# Patient Record
Sex: FEMALE | Race: WHITE | NOT HISPANIC OR LATINO | Employment: FULL TIME | URBAN - METROPOLITAN AREA
[De-identification: names, ages, dates, MRNs, and addresses within clinical notes are randomized per-mention and may not be internally consistent; named-entity substitution may affect disease eponyms.]

---

## 2024-07-24 ENCOUNTER — NURSE TRIAGE (OUTPATIENT)
Dept: CALL CENTER | Facility: HOSPITAL | Age: 49
End: 2024-07-24
Payer: MEDICAID

## 2024-07-24 NOTE — TELEPHONE ENCOUNTER
HUB call Blood pressure concerns  Wanting to establish care with new MD  Went to [facility] ED 07/22/2024    B/P was 233/over something  She was having a panic attack    Was prescribed Amlodipine 10 mg daily. Has been taking medication x 2 days     B/P today 190/104  Triage done and Care advice given  S/S reviewed when to seek emergent care  Wanting to establish care with new provider  Warm transfer to [place] office Spoke with Narciso at the [place] office  Reason for Disposition   Systolic BP  >= 180 OR Diastolic >= 110    Additional Information   Negative: Difficult to awaken or acting confused (e.g., disoriented, slurred speech)   Negative: SEVERE difficulty breathing (e.g., struggling for each breath, speaks in single words)   Negative: [1] Weakness of the face, arm or leg on one side of the body AND [2] new-onset   Negative: [1] Numbness (i.e., loss of sensation) of the face, arm or leg on one side of the body AND [2] new-onset   Negative: [1] Chest pain lasts > 5 minutes AND [2] history of heart disease (i.e., heart attack, bypass surgery, angina, angioplasty, CHF)   Negative: [1] Chest pain AND [2] took nitrogylcerin AND [3] pain was not relieved   Negative: Sounds like a life-threatening emergency to the triager   Negative: Symptom is main concern (e.g., headache, chest pain)   Negative: Low blood pressure is main concern   Negative: [1] Systolic BP  >= 160 OR Diastolic >= 100 AND [2] cardiac (e.g., breathing difficulty, chest pain) or neurologic symptoms (e.g., new-onset blurred or double vision, unsteady gait)   Negative: [1] Pregnant 20 or more weeks (or postpartum < 6 weeks) AND [2] new hand or face swelling   Negative: [1] Pregnant 20 or more weeks (or postpartum < 6 weeks) AND [2] Systolic BP >= 160 OR Diastolic >= 110   Negative: [1] Systolic BP  >= 200 OR Diastolic >= 120 AND [2] having NO cardiac or neurologic symptoms   Negative: [1] Pregnant 20 or more weeks (or postpartum < 6 weeks) AND  "[2] Systolic BP  >= 140 OR Diastolic >= 90   Negative: [1] Systolic BP  >= 180 OR Diastolic >= 110 AND [2] missed most recent dose of blood pressure medication    Answer Assessment - Initial Assessment Questions  1. BLOOD PRESSURE: \"What is the blood pressure?\" \"Did you take at least two measurements 5 minutes apart?\"      190/104  2. ONSET: \"When did you take your blood pressure?\"     This am  3. HOW: \"How did you take your blood pressure?\" (e.g., automatic home BP monitor, visiting nurse)  automatic  4. HISTORY: \"Do you have a history of high blood pressure?\"      Just diagnosed  5. MEDICINES: \"Are you taking any medicines for blood pressure?\" \"Have you missed   Started Amlodipine 10 mg daily 2 days ago        6. OTHER SYMPTOMS: \"Do you have any symptoms?\" (e.g., blurred vision, chest pain, difficulty breathing, headache, weakness)      Has had a headache  7. PREGNANCY: \"Is there any chance you are pregnant?\" \"When was your last menstrual period?\"     na    Protocols used: Blood Pressure - High-ADULT-AH    "

## 2024-08-04 PROBLEM — F41.9 ANXIETY: Status: ACTIVE | Noted: 2024-08-04

## 2024-08-04 PROBLEM — M54.2 NECK PAIN: Status: ACTIVE | Noted: 2024-08-04

## 2024-08-04 PROBLEM — K04.7 DENTAL INFECTION: Status: ACTIVE | Noted: 2024-08-04

## 2024-08-04 PROBLEM — M54.2 NECK PAIN: Chronic | Status: ACTIVE | Noted: 2024-08-04

## 2024-08-04 NOTE — PATIENT INSTRUCTIONS
Health Maintenance Due   Topic Date Due    MAMMOGRAM  Never done    COLORECTAL CANCER SCREENING  Never done    TDAP/TD VACCINES (1 - Tdap) Never done    COVID-19 Vaccine (1 - 2023-24 season) Never done    HEPATITIS C SCREENING  Never done    ANNUAL PHYSICAL  Never done    PAP SMEAR  Never done    INFLUENZA VACCINE  08/01/2024    3 days after starts toprolol Check blood pressure cuff for accuracy and send 10 blood pressures over 2 weeks.  Watch sodium, alcohol and weight

## 2024-08-05 ENCOUNTER — LAB (OUTPATIENT)
Dept: FAMILY MEDICINE CLINIC | Facility: CLINIC | Age: 49
End: 2024-08-05
Payer: MEDICAID

## 2024-08-05 ENCOUNTER — OFFICE VISIT (OUTPATIENT)
Dept: FAMILY MEDICINE CLINIC | Facility: CLINIC | Age: 49
End: 2024-08-05
Payer: MEDICAID

## 2024-08-05 VITALS
BODY MASS INDEX: 57.41 KG/M2 | SYSTOLIC BLOOD PRESSURE: 135 MMHG | DIASTOLIC BLOOD PRESSURE: 87 MMHG | WEIGHT: 292.4 LBS | OXYGEN SATURATION: 96 % | HEIGHT: 60 IN | TEMPERATURE: 97.7 F | HEART RATE: 90 BPM

## 2024-08-05 DIAGNOSIS — Z00.01 ENCOUNTER FOR ANNUAL GENERAL MEDICAL EXAMINATION WITH ABNORMAL FINDINGS IN ADULT: Primary | ICD-10-CM

## 2024-08-05 DIAGNOSIS — Z12.11 SCREENING FOR MALIGNANT NEOPLASM OF COLON: ICD-10-CM

## 2024-08-05 DIAGNOSIS — N39.0 URINARY TRACT INFECTION WITHOUT HEMATURIA, SITE UNSPECIFIED: ICD-10-CM

## 2024-08-05 DIAGNOSIS — Z91.89 ENCOUNTER FOR HEPATITIS C VIRUS SCREENING TEST FOR HIGH RISK PATIENT: ICD-10-CM

## 2024-08-05 DIAGNOSIS — M54.2 NECK PAIN: Chronic | ICD-10-CM

## 2024-08-05 DIAGNOSIS — Z12.4 SCREENING FOR CERVICAL CANCER: ICD-10-CM

## 2024-08-05 DIAGNOSIS — K04.7 DENTAL INFECTION: ICD-10-CM

## 2024-08-05 DIAGNOSIS — E55.9 VITAMIN D DEFICIENCY: ICD-10-CM

## 2024-08-05 DIAGNOSIS — F41.9 ANXIETY: ICD-10-CM

## 2024-08-05 DIAGNOSIS — Z12.11 SCREENING FOR COLON CANCER: ICD-10-CM

## 2024-08-05 DIAGNOSIS — Z13.220 SCREENING CHOLESTEROL LEVEL: ICD-10-CM

## 2024-08-05 DIAGNOSIS — Z11.4 SCREENING FOR HIV (HUMAN IMMUNODEFICIENCY VIRUS): ICD-10-CM

## 2024-08-05 DIAGNOSIS — Z12.31 ENCOUNTER FOR SCREENING MAMMOGRAM FOR MALIGNANT NEOPLASM OF BREAST: ICD-10-CM

## 2024-08-05 DIAGNOSIS — Z13.1 SCREENING FOR DIABETES MELLITUS: ICD-10-CM

## 2024-08-05 DIAGNOSIS — Z11.59 ENCOUNTER FOR HEPATITIS C VIRUS SCREENING TEST FOR HIGH RISK PATIENT: ICD-10-CM

## 2024-08-05 DIAGNOSIS — E66.01 CLASS 3 SEVERE OBESITY DUE TO EXCESS CALORIES WITH SERIOUS COMORBIDITY AND BODY MASS INDEX (BMI) OF 50.0 TO 59.9 IN ADULT: ICD-10-CM

## 2024-08-05 PROBLEM — E66.813 CLASS 3 SEVERE OBESITY DUE TO EXCESS CALORIES WITH SERIOUS COMORBIDITY AND BODY MASS INDEX (BMI) OF 50.0 TO 59.9 IN ADULT: Status: ACTIVE | Noted: 2024-08-05

## 2024-08-05 LAB
ALBUMIN SERPL-MCNC: 4.6 G/DL (ref 3.5–5.2)
ALBUMIN/GLOB SERPL: 1.4 G/DL
ALP SERPL-CCNC: 85 U/L (ref 39–117)
ALT SERPL W P-5'-P-CCNC: 53 U/L (ref 1–33)
ANION GAP SERPL CALCULATED.3IONS-SCNC: 14.9 MMOL/L (ref 5–15)
AST SERPL-CCNC: 31 U/L (ref 1–32)
BASOPHILS # BLD AUTO: 0.07 10*3/MM3 (ref 0–0.2)
BASOPHILS NFR BLD AUTO: 0.6 % (ref 0–1.5)
BILIRUB SERPL-MCNC: 0.6 MG/DL (ref 0–1.2)
BILIRUB UR QL STRIP: NEGATIVE
BUN SERPL-MCNC: 9 MG/DL (ref 6–20)
BUN/CREAT SERPL: 12.9 (ref 7–25)
CALCIUM SPEC-SCNC: 10.2 MG/DL (ref 8.6–10.5)
CHLORIDE SERPL-SCNC: 96 MMOL/L (ref 98–107)
CHOLEST SERPL-MCNC: 200 MG/DL (ref 0–200)
CLARITY UR: CLEAR
CO2 SERPL-SCNC: 26.1 MMOL/L (ref 22–29)
COLOR UR: YELLOW
CREAT SERPL-MCNC: 0.7 MG/DL (ref 0.57–1)
DEPRECATED RDW RBC AUTO: 45.1 FL (ref 37–54)
EGFRCR SERPLBLD CKD-EPI 2021: 106.2 ML/MIN/1.73
EOSINOPHIL # BLD AUTO: 0.14 10*3/MM3 (ref 0–0.4)
EOSINOPHIL NFR BLD AUTO: 1.2 % (ref 0.3–6.2)
ERYTHROCYTE [DISTWIDTH] IN BLOOD BY AUTOMATED COUNT: 13.5 % (ref 12.3–15.4)
GLOBULIN UR ELPH-MCNC: 3.3 GM/DL
GLUCOSE SERPL-MCNC: 253 MG/DL (ref 65–99)
GLUCOSE UR STRIP-MCNC: ABNORMAL MG/DL
HCT VFR BLD AUTO: 53.5 % (ref 34–46.6)
HCV AB SER QL: NORMAL
HDLC SERPL-MCNC: 63 MG/DL (ref 40–60)
HGB BLD-MCNC: 17 G/DL (ref 12–15.9)
HGB UR QL STRIP.AUTO: NEGATIVE
HIV 1+2 AB+HIV1 P24 AG SERPL QL IA: NORMAL
IMM GRANULOCYTES # BLD AUTO: 0.06 10*3/MM3 (ref 0–0.05)
IMM GRANULOCYTES NFR BLD AUTO: 0.5 % (ref 0–0.5)
KETONES UR QL STRIP: ABNORMAL
LDLC SERPL CALC-MCNC: 111 MG/DL (ref 0–100)
LDLC/HDLC SERPL: 1.69 {RATIO}
LEUKOCYTE ESTERASE UR QL STRIP.AUTO: NEGATIVE
LYMPHOCYTES # BLD AUTO: 2.45 10*3/MM3 (ref 0.7–3.1)
LYMPHOCYTES NFR BLD AUTO: 20.9 % (ref 19.6–45.3)
MAGNESIUM SERPL-MCNC: 1.8 MG/DL (ref 1.6–2.6)
MCH RBC QN AUTO: 28.5 PG (ref 26.6–33)
MCHC RBC AUTO-ENTMCNC: 31.8 G/DL (ref 31.5–35.7)
MCV RBC AUTO: 89.8 FL (ref 79–97)
MONOCYTES # BLD AUTO: 1.07 10*3/MM3 (ref 0.1–0.9)
MONOCYTES NFR BLD AUTO: 9.1 % (ref 5–12)
NEUTROPHILS NFR BLD AUTO: 67.7 % (ref 42.7–76)
NEUTROPHILS NFR BLD AUTO: 7.95 10*3/MM3 (ref 1.7–7)
NITRITE UR QL STRIP: NEGATIVE
NRBC BLD AUTO-RTO: 0 /100 WBC (ref 0–0.2)
PH UR STRIP.AUTO: <=5 [PH] (ref 5–8)
PLATELET # BLD AUTO: 277 10*3/MM3 (ref 140–450)
PMV BLD AUTO: 11.5 FL (ref 6–12)
POTASSIUM SERPL-SCNC: 4.6 MMOL/L (ref 3.5–5.2)
PROT SERPL-MCNC: 7.9 G/DL (ref 6–8.5)
PROT UR QL STRIP: NEGATIVE
RBC # BLD AUTO: 5.96 10*6/MM3 (ref 3.77–5.28)
SODIUM SERPL-SCNC: 137 MMOL/L (ref 136–145)
SP GR UR STRIP: 1.01 (ref 1–1.03)
TRIGL SERPL-MCNC: 152 MG/DL (ref 0–150)
TSH SERPL DL<=0.05 MIU/L-ACNC: 1.2 UIU/ML (ref 0.27–4.2)
UROBILINOGEN UR QL STRIP: ABNORMAL
VLDLC SERPL-MCNC: 26 MG/DL (ref 5–40)
WBC NRBC COR # BLD AUTO: 11.74 10*3/MM3 (ref 3.4–10.8)

## 2024-08-05 PROCEDURE — 80050 GENERAL HEALTH PANEL: CPT | Performed by: PREVENTIVE MEDICINE

## 2024-08-05 PROCEDURE — G0432 EIA HIV-1/HIV-2 SCREEN: HCPCS | Performed by: PREVENTIVE MEDICINE

## 2024-08-05 PROCEDURE — 86803 HEPATITIS C AB TEST: CPT | Performed by: PREVENTIVE MEDICINE

## 2024-08-05 PROCEDURE — 82607 VITAMIN B-12: CPT | Performed by: PREVENTIVE MEDICINE

## 2024-08-05 PROCEDURE — 82306 VITAMIN D 25 HYDROXY: CPT | Performed by: PREVENTIVE MEDICINE

## 2024-08-05 PROCEDURE — 80061 LIPID PANEL: CPT | Performed by: PREVENTIVE MEDICINE

## 2024-08-05 PROCEDURE — 36415 COLL VENOUS BLD VENIPUNCTURE: CPT | Performed by: PREVENTIVE MEDICINE

## 2024-08-05 PROCEDURE — 81003 URINALYSIS AUTO W/O SCOPE: CPT | Performed by: PREVENTIVE MEDICINE

## 2024-08-05 PROCEDURE — 83735 ASSAY OF MAGNESIUM: CPT | Performed by: PREVENTIVE MEDICINE

## 2024-08-05 RX ORDER — BUSPIRONE HYDROCHLORIDE 10 MG/1
10 TABLET ORAL 2 TIMES DAILY
Qty: 60 TABLET | Refills: 1 | Status: SHIPPED | OUTPATIENT
Start: 2024-08-05

## 2024-08-05 RX ORDER — METOPROLOL SUCCINATE 50 MG/1
50 TABLET, EXTENDED RELEASE ORAL DAILY
Qty: 90 TABLET | Refills: 1 | Status: SHIPPED | OUTPATIENT
Start: 2024-08-05

## 2024-08-05 RX ORDER — CLINDAMYCIN HYDROCHLORIDE 300 MG/1
300 CAPSULE ORAL
COMMUNITY
Start: 2024-07-30 | End: 2024-08-10

## 2024-08-05 RX ORDER — AMLODIPINE BESYLATE 10 MG/1
10 TABLET ORAL
COMMUNITY
Start: 2024-07-22 | End: 2024-08-06 | Stop reason: SDUPTHER

## 2024-08-05 RX ORDER — CHLORHEXIDINE GLUCONATE ORAL RINSE 1.2 MG/ML
SOLUTION DENTAL
COMMUNITY
Start: 2024-07-12

## 2024-08-05 NOTE — ASSESSMENT & PLAN NOTE
Patient's (Body mass index is 57.11 kg/m².) indicates that they are morbidly/severely obese (BMI > 40 or > 35 with obesity - related health condition) with health conditions that include none . Weight is newly identified. BMI  is above average; BMI management plan is completed. We discussed portion control and increasing exercise.

## 2024-08-05 NOTE — PROGRESS NOTES
Subjective   Karissa Hi is a 48 y.o. female presents for   Chief Complaint   Patient presents with    Annual Exam     Hasn't been to the doctor in 20 years.   Has a tooth that needs removed and that she can't have it removed till her BP gets under control.  Panic attacks began when started taking the meds for the tooth.    Establish Care    Panic Attack    Hypertension       Health Maintenance Due   Topic Date Due    MAMMOGRAM  Never done    COLORECTAL CANCER SCREENING  Never done    TDAP/TD VACCINES (1 - Tdap) Never done    COVID-19 Vaccine (1 - 2023-24 season) Never done    ANNUAL PHYSICAL  Never done    PAP SMEAR  Never done    INFLUENZA VACCINE  08/01/2024   Patient was advised to wear sunscreen and a seatbelt as part of her age-specific physical and she was here to establish care    Panic Attack  Associated symptoms include neck pain.   Hypertension  Associated symptoms include neck pain.      History of Present Illness  The patient is a 48-year-old female who is here today for an annual wellness exam and to establish care, also to follow up for mammogram, colon cancer screening, screening for HIV and hepatitis C, vitamin D deficiency, screening for diabetes, screening for cholesterol, screening for cervical cancer, encounter for hepatitis C testing, anxiety, neck pain, dental infection screening, and class 3 severe obesity due to excess calories with serious comorbidities and a body mass index of 57.    The patient sought emergency care at [facility] on 07/23/2023 due to a toothache and neck pain. Initially, she suspected a myocardial infarction, which was later attributed to an allergic reaction to AMOXICILLIN. She completed a course of AMOXICILLIN. Her blood pressure was recorded as 233 systolic, prompting a course of antihypertensive medication. She was unable to undergo extraction of one of her teeth until her blood pressure stabilized. She was diagnosed with a urinary tract infection  "and was prescribed antibiotics. Her white blood cell count was elevated during her second hospital visit. She experienced hot flashes for several days. Her neck pain has improved, but she continues to experience panic attacks. She denies experiencing homicidal or suicidal ideation. She experiences numbness in her hands between her shoulder blades during panic attacks. She has arthritis in her arms, legs, and neck. She experienced constipation last week, which has since resolved. She developed a rash on her legs 2 days after her initial ER visit, which has since caused swelling in her toes. She denies caffeine, tobacco, drugs, or alcohol use. She denies any serious illnesses. She denies any hearing changes, sore throat, or thyroid issues. She denies hemoptysis or wheezing. She experiences occasional heart flutters, which she attributes to panic attacks. She denies dysphagia or indigestion, but reports frequent belching. She denies melena or hematochezia. She reports dysuria and night sweats.    Supplemental Information  She has lost 30 pounds since her last visit. She has cut out soft drinks and snacks. She has started to walk.    ALLERGIES  She is allergic to PENICILLIN.    Vitals:    08/05/24 1150 08/05/24 1152 08/05/24 1231 08/05/24 1232   BP: 159/99 (!) 171/115 (!) 166/102 135/87   BP Location: Right arm Left arm Left arm Right arm   Patient Position: Sitting Sitting Lying Lying   Cuff Size: Adult Adult  Adult   Pulse: 98 102 90 90   Temp: 97.7 °F (36.5 °C)      TempSrc: Temporal      SpO2: 96%      Weight: 133 kg (292 lb 6.4 oz)      Height: 152.4 cm (60\")        Body mass index is 57.11 kg/m².    Current Outpatient Medications on File Prior to Visit   Medication Sig Dispense Refill    amLODIPine (NORVASC) 10 MG tablet Take 1 tablet by mouth.      chlorhexidine (PERIDEX) 0.12 % solution SWISH WITH 15 ML BY MOUTH FOR 30 SECONDS AND SPIT TWICE DAILY AS NEEDED.      clindamycin (CLEOCIN) 300 MG capsule Take 1 " capsule by mouth.       No current facility-administered medications on file prior to visit.       The following portions of the patient's history were reviewed and updated as appropriate: allergies, current medications, past family history, past medical history, past social history, past surgical history, and problem list.    Review of Systems   HENT:  Positive for dental problem.    Musculoskeletal:  Positive for neck pain.   Psychiatric/Behavioral:  The patient is nervous/anxious.        Objective   Physical Exam  Vitals reviewed.   Constitutional:       General: She is not in acute distress.     Appearance: She is well-developed. She is obese. She is not ill-appearing or toxic-appearing.   HENT:      Head: Normocephalic and atraumatic.      Right Ear: Tympanic membrane, ear canal and external ear normal.      Left Ear: Tympanic membrane, ear canal and external ear normal.      Nose: Nose normal.      Mouth/Throat:      Mouth: Mucous membranes are moist.      Comments: Poor oral hygiene with a blackened right lateral incisor and up lower molar.  Eyes:      Extraocular Movements: Extraocular movements intact.      Conjunctiva/sclera: Conjunctivae normal.      Pupils: Pupils are equal, round, and reactive to light.   Cardiovascular:      Rate and Rhythm: Normal rate and regular rhythm.      Heart sounds: Normal heart sounds.   Pulmonary:      Effort: Pulmonary effort is normal.      Breath sounds: Normal breath sounds.   Abdominal:      General: Bowel sounds are normal. There is no distension.      Palpations: Abdomen is soft. There is no mass.      Tenderness: There is no abdominal tenderness.   Musculoskeletal:         General: Normal range of motion.      Cervical back: Neck supple.   Skin:     General: Skin is warm.   Neurological:      General: No focal deficit present.      Mental Status: She is alert and oriented to person, place, and time.   Psychiatric:         Mood and Affect: Mood normal.          Behavior: Behavior normal.       Physical Exam  Throat is slightly red.  Carotid arteries sound normal.  Lungs sound normal.  Heart has a regular rate and rhythm.    PHQ-9 Total Score: 20  Results  Laboratory Studies  White blood cell count was negative. Urine had a lot of bacteria and leukocyte esterase. Hemoglobin, hematocrit, lactate, and platelets were normal.         Assessment & Plan   Diagnoses and all orders for this visit:    1. Encounter for annual general medical examination with abnormal findings in adult (Primary)  -     CBC Auto Differential    2. Encounter for screening mammogram for malignant neoplasm of breast  -     Mammo Screening Digital Tomosynthesis Bilateral With CAD; Future    3. Screening for colon cancer    4. Screening for HIV (human immunodeficiency virus)  -     HIV-1 / O / 2 Ag / Antibody    5. Encounter for hepatitis C virus screening test for high risk patient  -     Hepatitis C Antibody    6. Vitamin D deficiency  -     Vitamin D,25-Hydroxy    7. Screening for diabetes mellitus  -     Comprehensive Metabolic Panel    8. Screening cholesterol level  -     Lipid Panel    9. Screening for cervical cancer  -     Ambulatory Referral to Gynecology    10. Anxiety  -     Magnesium  -     Vitamin B12  -     TSH Rfx On Abnormal To Free T4    11. Neck pain    12. Dental infection    13. Class 3 severe obesity due to excess calories with serious comorbidity and body mass index (BMI) of 50.0 to 59.9 in adult  Assessment & Plan:  Patient's (Body mass index is 57.11 kg/m².) indicates that they are morbidly/severely obese (BMI > 40 or > 35 with obesity - related health condition) with health conditions that include none . Weight is newly identified. BMI  is above average; BMI management plan is completed. We discussed portion control and increasing exercise.       14. Screening for malignant neoplasm of colon  -     Ambulatory Referral to Colorectal Surgery    15. Urinary tract infection without  hematuria, site unspecified  -     Urinalysis With Culture If Indicated - Urine, Clean Catch  -     Cancel: [place] Urine Culture Tube - Urine, Clean Catch    Other orders  -     busPIRone (BUSPAR) 10 MG tablet; Take 1 tablet by mouth 2 (Two) Times a Day.  Dispense: 60 tablet; Refill: 1  -     metoprolol succinate XL (Toprol XL) 50 MG 24 hr tablet; Take 1 tablet by mouth Daily.  Dispense: 90 tablet; Refill: 1      Assessment & Plan  1. Annual wellness exam.  A mammogram will be ordered. A colonoscopy will be ordered. Blood work will be conducted today.    Follow-up  A follow-up appointment is scheduled for 3 months from now.    Patient Instructions     Health Maintenance Due   Topic Date Due    MAMMOGRAM  Never done    COLORECTAL CANCER SCREENING  Never done    TDAP/TD VACCINES (1 - Tdap) Never done    COVID-19 Vaccine (1 - 2023-24 season) Never done    HEPATITIS C SCREENING  Never done    ANNUAL PHYSICAL  Never done    PAP SMEAR  Never done    INFLUENZA VACCINE  08/01/2024    3 days after starts toprolol Check blood pressure cuff for accuracy and send 10 blood pressures over 2 weeks.  Watch sodium, alcohol and weight        Patient or patient representative verbalized consent for the use of Ambient Listening during the visit with  Mariella Anglin MD for chart documentation. 8/5/2024  18:22 EDT

## 2024-08-06 ENCOUNTER — TELEPHONE (OUTPATIENT)
Dept: FAMILY MEDICINE CLINIC | Facility: CLINIC | Age: 49
End: 2024-08-06
Payer: MEDICAID

## 2024-08-06 DIAGNOSIS — E11.43 TYPE 2 DIABETES MELLITUS WITH DIABETIC AUTONOMIC NEUROPATHY, WITHOUT LONG-TERM CURRENT USE OF INSULIN: Primary | ICD-10-CM

## 2024-08-06 DIAGNOSIS — E11.43 TYPE 2 DIABETES MELLITUS WITH DIABETIC AUTONOMIC NEUROPATHY, WITHOUT LONG-TERM CURRENT USE OF INSULIN: ICD-10-CM

## 2024-08-06 DIAGNOSIS — E78.5 HYPERLIPIDEMIA, UNSPECIFIED HYPERLIPIDEMIA TYPE: ICD-10-CM

## 2024-08-06 DIAGNOSIS — D75.1 POLYCYTHEMIA: Primary | ICD-10-CM

## 2024-08-06 PROBLEM — E11.65 TYPE 2 DIABETES MELLITUS WITH HYPERGLYCEMIA, WITHOUT LONG-TERM CURRENT USE OF INSULIN: Status: ACTIVE | Noted: 2024-08-06

## 2024-08-06 PROBLEM — E78.49 OTHER HYPERLIPIDEMIA: Status: ACTIVE | Noted: 2024-08-06

## 2024-08-06 LAB
25(OH)D3 SERPL-MCNC: 26.6 NG/ML (ref 30–100)
VIT B12 BLD-MCNC: 785 PG/ML (ref 211–946)

## 2024-08-06 RX ORDER — DIPHENHYDRAMINE HYDROCHLORIDE 25 MG/1
1 CAPSULE, LIQUID FILLED ORAL DAILY
Qty: 1 EACH | Refills: 0 | Status: SHIPPED | OUTPATIENT
Start: 2024-08-06

## 2024-08-06 RX ORDER — METFORMIN HYDROCHLORIDE 500 MG/1
TABLET, EXTENDED RELEASE ORAL
Qty: 180 TABLET | Refills: 1 | Status: SHIPPED | OUTPATIENT
Start: 2024-08-06

## 2024-08-06 RX ORDER — AMLODIPINE BESYLATE 10 MG/1
10 TABLET ORAL DAILY
Qty: 30 TABLET | Refills: 0 | Status: SHIPPED | OUTPATIENT
Start: 2024-08-06 | End: 2024-09-06

## 2024-08-06 RX ORDER — ATORVASTATIN CALCIUM 20 MG/1
20 TABLET, FILM COATED ORAL DAILY
Qty: 90 TABLET | Refills: 1 | Status: SHIPPED | OUTPATIENT
Start: 2024-08-06

## 2024-08-06 NOTE — TELEPHONE ENCOUNTER
"Relay     \"If these labs were truly fasting you are in the diabetic range with a blood sugar of 253.  We would be glad to start you on a medicine called metformin have you monitor your sugars every morning before breakfast and attend diabetic education which can be done by video visit.  Please let me know as soon as possible vitamin D is decreased so take 1000 units daily over-the-counter.  Total cholesterol was 200 and goal is below 200 bad cholesterol is 111 and goal is below 70 can you do more to decrease your saturated fats and increase your walking or do you want to consider starting a statin.  White blood cell count was slightly elevated and we will continue to monitor as your red blood cell count was elevated as well.  We should probably repeat the blood count in 6 weeks along with a hemoglobin A1c  and lipid profile.  call if any other questions or concerns and let us know about the above\"              e  "

## 2024-08-06 NOTE — PROGRESS NOTES
If these labs were truly fasting you are in the diabetic range with a blood sugar of 253.  We would be glad to start you on a medicine called metformin have you monitor your sugars every morning before breakfast and attend diabetic education which can be done by video visit.  Please let me know as soon as possible vitamin D is decreased so take 1000 units daily over-the-counter.  Total cholesterol was 200 and goal is below 200 bad cholesterol is 111 and goal is below 70 can you do more to decrease your saturated fats and increase your walking or do you want to consider starting a statin.  White blood cell count was slightly elevated and we will continue to monitor as your red blood cell count was elevated as well.  We should probably repeat the blood count in 6 weeks along with a hemoglobin A1c  and lipid profile.  call if any other questions or concerns and let us know about the above

## 2024-08-06 NOTE — TELEPHONE ENCOUNTER
Wanting to know if you will send in amlodipine for her BP.  ER on prescribed for 30 days and she will be out.      Will try metformin and will work on diet and exercise.    Unsure if she needs another antibiotic for tooth since she still has the infection and can't get pulled till BP is undercontrol.

## 2024-08-06 NOTE — TELEPHONE ENCOUNTER
Hopefully, will be able to pull next week-no additional antibiotic if we can do.  Send amlodipine and I'll sign

## 2024-08-07 ENCOUNTER — TELEPHONE (OUTPATIENT)
Dept: FAMILY MEDICINE CLINIC | Facility: CLINIC | Age: 49
End: 2024-08-07
Payer: MEDICAID

## 2024-08-07 NOTE — TELEPHONE ENCOUNTER
"Relay     \"Your urine was not tested against clindamycin so make sure you take it all till its gone in 3 days after you are off the medicine make sure you reculture your urine either here or bring in clean-catch midstream specimen call if any other questions or concerns\"                "

## 2024-08-13 ENCOUNTER — PREP FOR SURGERY (OUTPATIENT)
Dept: OTHER | Facility: HOSPITAL | Age: 49
End: 2024-08-13
Payer: MEDICAID

## 2024-08-13 DIAGNOSIS — Z12.11 ENCOUNTER FOR SCREENING COLONOSCOPY: Primary | ICD-10-CM

## 2024-08-13 RX ORDER — SODIUM CHLORIDE, SODIUM LACTATE, POTASSIUM CHLORIDE, CALCIUM CHLORIDE 600; 310; 30; 20 MG/100ML; MG/100ML; MG/100ML; MG/100ML
30 INJECTION, SOLUTION INTRAVENOUS CONTINUOUS
OUTPATIENT
Start: 2024-08-13

## 2024-08-13 RX ORDER — SODIUM, POTASSIUM,MAG SULFATES 17.5-3.13G
SOLUTION, RECONSTITUTED, ORAL ORAL
Qty: 177 ML | Refills: 0 | Status: SHIPPED | OUTPATIENT
Start: 2024-08-13

## 2024-08-20 ENCOUNTER — CLINICAL SUPPORT (OUTPATIENT)
Dept: FAMILY MEDICINE CLINIC | Facility: CLINIC | Age: 49
End: 2024-08-20
Payer: MEDICAID

## 2024-08-20 DIAGNOSIS — N39.0 URINARY TRACT INFECTION WITHOUT HEMATURIA, SITE UNSPECIFIED: Primary | ICD-10-CM

## 2024-08-20 PROCEDURE — 87086 URINE CULTURE/COLONY COUNT: CPT | Performed by: PREVENTIVE MEDICINE

## 2024-08-22 LAB — BACTERIA SPEC AEROBE CULT: NO GROWTH

## 2024-08-23 ENCOUNTER — TELEPHONE (OUTPATIENT)
Dept: FAMILY MEDICINE CLINIC | Facility: CLINIC | Age: 49
End: 2024-08-23

## 2024-08-23 ENCOUNTER — LAB (OUTPATIENT)
Dept: FAMILY MEDICINE CLINIC | Facility: CLINIC | Age: 49
End: 2024-08-23
Payer: MEDICAID

## 2024-08-23 LAB
BASOPHILS # BLD AUTO: 0.07 10*3/MM3 (ref 0–0.2)
BASOPHILS NFR BLD AUTO: 1 % (ref 0–1.5)
CHOLEST SERPL-MCNC: 188 MG/DL (ref 0–200)
DEPRECATED RDW RBC AUTO: 44 FL (ref 37–54)
EOSINOPHIL # BLD AUTO: 0.27 10*3/MM3 (ref 0–0.4)
EOSINOPHIL NFR BLD AUTO: 3.7 % (ref 0.3–6.2)
ERYTHROCYTE [DISTWIDTH] IN BLOOD BY AUTOMATED COUNT: 13.8 % (ref 12.3–15.4)
HBA1C MFR BLD: 11.7 % (ref 4.8–5.6)
HCT VFR BLD AUTO: 44.7 % (ref 34–46.6)
HDLC SERPL-MCNC: 50 MG/DL (ref 40–60)
HGB BLD-MCNC: 15 G/DL (ref 12–15.9)
IMM GRANULOCYTES # BLD AUTO: 0.03 10*3/MM3 (ref 0–0.05)
IMM GRANULOCYTES NFR BLD AUTO: 0.4 % (ref 0–0.5)
LDLC SERPL CALC-MCNC: 106 MG/DL (ref 0–100)
LDLC/HDLC SERPL: 2.02 {RATIO}
LYMPHOCYTES # BLD AUTO: 2.6 10*3/MM3 (ref 0.7–3.1)
LYMPHOCYTES NFR BLD AUTO: 35.6 % (ref 19.6–45.3)
MCH RBC QN AUTO: 29.9 PG (ref 26.6–33)
MCHC RBC AUTO-ENTMCNC: 33.6 G/DL (ref 31.5–35.7)
MCV RBC AUTO: 89.2 FL (ref 79–97)
MONOCYTES # BLD AUTO: 0.75 10*3/MM3 (ref 0.1–0.9)
MONOCYTES NFR BLD AUTO: 10.3 % (ref 5–12)
NEUTROPHILS NFR BLD AUTO: 3.58 10*3/MM3 (ref 1.7–7)
NEUTROPHILS NFR BLD AUTO: 49 % (ref 42.7–76)
NRBC BLD AUTO-RTO: 0 /100 WBC (ref 0–0.2)
PLATELET # BLD AUTO: 252 10*3/MM3 (ref 140–450)
PMV BLD AUTO: 12.7 FL (ref 6–12)
RBC # BLD AUTO: 5.01 10*6/MM3 (ref 3.77–5.28)
TRIGL SERPL-MCNC: 186 MG/DL (ref 0–150)
VLDLC SERPL-MCNC: 32 MG/DL (ref 5–40)
WBC NRBC COR # BLD AUTO: 7.3 10*3/MM3 (ref 3.4–10.8)

## 2024-08-23 PROCEDURE — 80061 LIPID PANEL: CPT | Performed by: PREVENTIVE MEDICINE

## 2024-08-23 PROCEDURE — 85025 COMPLETE CBC W/AUTO DIFF WBC: CPT | Performed by: PREVENTIVE MEDICINE

## 2024-08-23 PROCEDURE — 83036 HEMOGLOBIN GLYCOSYLATED A1C: CPT | Performed by: PREVENTIVE MEDICINE

## 2024-08-23 NOTE — TELEPHONE ENCOUNTER
----- Message from [employer] sent at 8/23/2024 10:03 AM EDT -----  Regarding: Bp  Contact: [phone]  Here is my bp readings forget while I was in there ! Thank you

## 2024-08-23 NOTE — TELEPHONE ENCOUNTER
PATIENT HERE TODAY FOR HER LABS.     SHE STATES SHE IS DIABETIC AND WOULD LIKE TO START SOMETHING TO HELP WITH WEIGHT LOSS.

## 2024-08-23 NOTE — TELEPHONE ENCOUNTER
BP Average 136/84    PATIENT HERE TODAY FOR HER LABS.     SHE STATES SHE IS DIABETIC AND WOULD LIKE TO START SOMETHING TO HELP WITH WEIGHT LOSS.

## 2024-08-25 RX ORDER — SEMAGLUTIDE 0.68 MG/ML
0.5 INJECTION, SOLUTION SUBCUTANEOUS
Qty: 3 ML | Refills: 0 | Status: SHIPPED | OUTPATIENT
Start: 2024-09-22 | End: 2024-10-14

## 2024-08-25 RX ORDER — SEMAGLUTIDE 1.34 MG/ML
0.25 INJECTION, SOLUTION SUBCUTANEOUS WEEKLY
Qty: 1.5 ML | Refills: 0 | Status: SHIPPED | OUTPATIENT
Start: 2024-08-25

## 2024-08-25 RX ORDER — SEMAGLUTIDE 1.34 MG/ML
1 INJECTION, SOLUTION SUBCUTANEOUS
Qty: 3 ML | Refills: 0 | Status: SHIPPED | OUTPATIENT
Start: 2024-10-20 | End: 2024-11-11

## 2024-08-25 NOTE — TELEPHONE ENCOUNTER
Have also printed out orders for Ozempic call if any questions or concerns.  Have pharmacist show her how to inject and let us know if she is having any problems.  Make sure she carry sugar source with her at all times since we are starting her on this new medicine and have also increased her metformin.

## 2024-08-26 NOTE — TELEPHONE ENCOUNTER
Rx Refill Note  Requested Prescriptions     Signed Prescriptions Disp Refills    Semaglutide,0.25 or 0.5MG/DOS, (Ozempic, 0.25 or 0.5 MG/DOSE,) 2 MG/1.5ML solution pen-injector 1.5 mL 0     Sig: Inject 0.25 mg under the skin into the appropriate area as directed 1 (One) Time Per Week. For four weeks     Authorizing Provider: MICHAEL KOO    Semaglutide,0.25 or 0.5MG/DOS, (Ozempic, 0.25 or 0.5 MG/DOSE,) 2 MG/3ML solution pen-injector 3 mL 0     Sig: Inject 0.5 mg under the skin into the appropriate area as directed Every 7 (Seven) Days for 4 doses. Continue 4 weeks     Authorizing Provider: MICHAEL KOO    Semaglutide, 1 MG/DOSE, (Ozempic, 1 MG/DOSE,) 4 MG/3ML solution pen-injector 3 mL 0     Sig: Inject 1 mg under the skin into the appropriate area as directed Every 7 (Seven) Days for 4 doses. Continue for 4 weeks     Authorizing Provider: MICHAEL KOO    Semaglutide, 2 MG/DOSE, (OZEMPIC) 8 MG/3ML solution pen-injector 3 mL 0     Sig: Inject 2 mg under the skin into the appropriate area as directed Every 7 (Seven) Days for 4 doses. Continue for 4 weeks     Authorizing Provider: MICHAEL KOO      Last office visit with prescribing clinician: 8/5/2024   Last telemedicine visit with prescribing clinician: Visit date not found   Next office visit with prescribing clinician: 11/8/2024                         Would you like a call back once the refill request has been completed: [] Yes [] No    If the office needs to give you a call back, can they leave a voicemail: [] Yes [] No    Meka Gaming MA  08/26/24, 08:31 EDT

## 2024-08-30 ENCOUNTER — TELEPHONE (OUTPATIENT)
Dept: FAMILY MEDICINE CLINIC | Facility: CLINIC | Age: 49
End: 2024-08-30
Payer: MEDICAID

## 2024-08-30 NOTE — TELEPHONE ENCOUNTER
Patient called in and stated that she is having tingling hands, her feet are swelling, a headache and blurry vision. She thinks it is from one of her meds that she was recently put on but can't figure out which one.

## 2024-09-02 RX ORDER — AMLODIPINE BESYLATE 10 MG/1
10 TABLET ORAL DAILY
Qty: 30 TABLET | Refills: 0 | Status: SHIPPED | OUTPATIENT
Start: 2024-09-02

## 2024-09-02 NOTE — TELEPHONE ENCOUNTER
Rx Refill Note  Requested Prescriptions     Pending Prescriptions Disp Refills   • amLODIPine (NORVASC) 10 MG tablet [Pharmacy Med Name: AMLODIPINE BESYLATE 10MG TABLETS] 30 tablet 0     Sig: TAKE 1 TABLET BY MOUTH DAILY      Last office visit with prescribing clinician: 8/5/2024      Next office visit with prescribing clinician: 11/8/2024   3}  Jolynn New  09/02/24, 09:36 EDT

## 2024-09-04 ENCOUNTER — TELEPHONE (OUTPATIENT)
Dept: FAMILY MEDICINE CLINIC | Facility: CLINIC | Age: 49
End: 2024-09-04
Payer: MEDICAID

## 2024-09-04 NOTE — TELEPHONE ENCOUNTER
Caller: Suzy Hi    Relationship: Self    Best call back number: [phone]     What is the best time to reach you: ANY    Who are you requesting to speak with (clinical staff, provider,  specific staff member): CLINICAL    Do you know the name of the person who called: SUZY    What was the call regarding: PATIENT STATES THAT PHARMACY ([facility]) WILL BE SENDING PAPER FOR PRIOR AUTHORIZATION ON OZEMPIC

## 2024-09-05 ENCOUNTER — PRIOR AUTHORIZATION (OUTPATIENT)
Dept: FAMILY MEDICINE CLINIC | Facility: CLINIC | Age: 49
End: 2024-09-05
Payer: MEDICAID

## 2024-09-05 DIAGNOSIS — E11.65 TYPE 2 DIABETES MELLITUS WITH HYPERGLYCEMIA, WITHOUT LONG-TERM CURRENT USE OF INSULIN: Primary | ICD-10-CM

## 2024-09-05 NOTE — TELEPHONE ENCOUNTER
Karissa Hi notified and voiced comprehension and understanding.  Patient would like the referral to endocrinology.

## 2024-09-05 NOTE — TELEPHONE ENCOUNTER
Please let patient know and would be glad to refer to endocrinologist to see if they can get approved

## 2024-09-30 RX ORDER — AMLODIPINE BESYLATE 10 MG/1
10 TABLET ORAL DAILY
Qty: 30 TABLET | Refills: 0 | Status: SHIPPED | OUTPATIENT
Start: 2024-09-30

## 2024-09-30 NOTE — TELEPHONE ENCOUNTER
Rx Refill Note  Requested Prescriptions     Pending Prescriptions Disp Refills   • amLODIPine (NORVASC) 10 MG tablet [Pharmacy Med Name: AMLODIPINE BESYLATE 10MG TABLETS] 30 tablet 0     Sig: TAKE 1 TABLET BY MOUTH DAILY      Last office visit with prescribing clinician: 8/5/2024   Last telemedicine visit with prescribing clinician: Visit date not found   Next office visit with prescribing clinician: 11/8/2024       Would you like a call back once the refill request has been completed: [] Yes [] No    If the office needs to give you a call back, can they leave a voicemail: [] Yes [] No    Karissa Cruz MA  09/30/24, 07:43 EDT

## 2024-10-30 ENCOUNTER — TELEPHONE (OUTPATIENT)
Dept: FAMILY MEDICINE CLINIC | Facility: CLINIC | Age: 49
End: 2024-10-30
Payer: MEDICAID

## 2024-10-30 RX ORDER — METFORMIN HYDROCHLORIDE 500 MG/1
TABLET, EXTENDED RELEASE ORAL
Qty: 180 TABLET | Refills: 1 | Status: SHIPPED | OUTPATIENT
Start: 2024-10-30

## 2024-10-30 RX ORDER — AMLODIPINE BESYLATE 10 MG/1
10 TABLET ORAL DAILY
Qty: 30 TABLET | Refills: 0 | Status: SHIPPED | OUTPATIENT
Start: 2024-10-30

## 2024-10-30 RX ORDER — BUSPIRONE HYDROCHLORIDE 10 MG/1
10 TABLET ORAL 2 TIMES DAILY
Qty: 60 TABLET | Refills: 1 | Status: SHIPPED | OUTPATIENT
Start: 2024-10-30

## 2024-10-30 NOTE — TELEPHONE ENCOUNTER
Rx Refill Note  Requested Prescriptions     Pending Prescriptions Disp Refills   • metFORMIN ER (GLUCOPHAGE-XR) 500 MG 24 hr tablet [Pharmacy Med Name: METFORMIN ER 500MG 24HR TABS] 180 tablet 1     Sig: TAKE 1 TABLET BY MOUTH IN THE MORNING AND IN THE EVENING FOR HIGH BLOOD SUGAR   • busPIRone (BUSPAR) 10 MG tablet [Pharmacy Med Name: BUSPIRONE 10MG TABLETS] 60 tablet 1     Sig: TAKE 1 TABLET BY MOUTH TWICE DAILY   • amLODIPine (NORVASC) 10 MG tablet [Pharmacy Med Name: AMLODIPINE BESYLATE 10MG TABLETS] 30 tablet 0     Sig: TAKE 1 TABLET BY MOUTH DAILY      Last office visit with prescribing clinician: 8/5/2024   Last telemedicine visit with prescribing clinician: Visit date not found   Next office visit with prescribing clinician: 11/8/2024       Would you like a call back once the refill request has been completed: [] Yes [] No    If the office needs to give you a call back, can they leave a voicemail: [] Yes [] No    Karissa Cruz MA  10/30/24, 08:26 EDT

## 2024-10-30 NOTE — TELEPHONE ENCOUNTER
Caller: Karissa Hi    Relationship: Self    Best call back number: [phone]     What medication are you requesting: MEDICATION TO HELP SYMPTOMS    What are your current symptoms: SORE THROAT, HEADACHE, STUFFED UP NOSE, SNEEZING, BODY ACHES    How long have you been experiencing symptoms: SINCE 10-    Have you had these symptoms before:    [x] Yes  [] No    Have you been treated for these symptoms before:   [x] Yes  [] No    If a prescription is needed, what is your preferred pharmacy and phone number[id number] [place] [address] - [phone]  - [phone]      Additional notes: PATIENT STATED OVER THE COUNTER MEDICATIONS ARE NOT HELPING HER SYMPTOMS, AND IS REQUESTING TO KNOW IF A MEDICATION MAY BE PRESCRIBED TO HELP.

## 2024-11-07 ENCOUNTER — TELEPHONE (OUTPATIENT)
Dept: FAMILY MEDICINE CLINIC | Facility: CLINIC | Age: 49
End: 2024-11-07
Payer: MEDICAID

## 2024-11-19 RX ORDER — METFORMIN HYDROCHLORIDE 500 MG/1
TABLET, EXTENDED RELEASE ORAL
Qty: 180 TABLET | Refills: 0 | Status: SHIPPED | OUTPATIENT
Start: 2024-11-19 | End: 2024-11-20 | Stop reason: SDUPTHER

## 2024-11-19 NOTE — TELEPHONE ENCOUNTER
Caller: Karissa Hi    Relationship: Self    Best call back number:   Telephone Information:   Mobile [phone]         Requested Prescriptions:   Requested Prescriptions     Pending Prescriptions Disp Refills    metFORMIN ER (GLUCOPHAGE-XR) 500 MG 24 hr tablet 180 tablet 1     Sig: TAKE 1 TABLET BY MOUTH IN THE MORNING AND IN THE EVENING FOR HIGH BLOOD SUGAR        Pharmacy where request should be sent: [facility][id number] - [address] [facility] [address] [place] [phone] [place] [phone] FX     Last office visit with prescribing clinician: 8/5/2024   Last telemedicine visit with prescribing clinician: Visit date not found   Next office visit with prescribing clinician: 11/22/2024     Additional details provided by patient: PATIENT ONLY HAS ENOUGH FOR TODAY 11/19    Does the patient have less than a 3 day supply:  [x] Yes  [] No    Would you like a call back once the refill request has been completed: [x] Yes [] No    If the office needs to give you a call back, can they leave a voicemail: [x] Yes [] No    Erna Paris Rep   11/19/24 09:08 EST

## 2024-11-20 ENCOUNTER — TELEPHONE (OUTPATIENT)
Dept: FAMILY MEDICINE CLINIC | Facility: CLINIC | Age: 49
End: 2024-11-20
Payer: MEDICAID

## 2024-11-20 RX ORDER — METFORMIN HYDROCHLORIDE 500 MG/1
TABLET, EXTENDED RELEASE ORAL
Qty: 360 TABLET | Refills: 0 | Status: SHIPPED | OUTPATIENT
Start: 2024-11-20

## 2024-11-20 NOTE — TELEPHONE ENCOUNTER
Caller: Karissa Hi    Relationship: Self    Best call back number:     [phone]       Which medication are you concerned about:   metFORMIN ER  metFORMIN ER (GLUCOPHAGE-XR) 500 MG 24 hr tablet       Who prescribed you this medication:   DR KOO    When did you start taking this medication: NA    What are your concerns:   [facility] TOLD PATIENT THAT ITS TO EARLY TO FILL THIS MEDICATION.  PLEASE ADVISE

## 2024-11-20 NOTE — TELEPHONE ENCOUNTER
Rx Refill Note  Requested Prescriptions     Pending Prescriptions Disp Refills    metFORMIN ER (GLUCOPHAGE-XR) 500 MG 24 hr tablet 360 tablet 0     Sig: TAKE 2 TABLETS BY MOUTH IN THE MORNING AND 2 TABLETS IN THE EVENING FOR HIGH BLOOD SUGAR      Last office visit with prescribing clinician: 8/5/2024   Last telemedicine visit with prescribing clinician: Visit date not found   Next office visit with prescribing clinician: 12/16/2024                         Would you like a call back once the refill request has been completed: [] Yes [] No    If the office needs to give you a call back, can they leave a voicemail: [] Yes [] No    Karissa Cruz MA  11/20/24, 13:23 EST

## 2024-12-13 NOTE — PATIENT INSTRUCTIONS
Health Maintenance Due   Topic Date Due    COLORECTAL CANCER SCREENING  Never done    Pneumococcal Vaccine 0-64 (1 of 2 - PCV) Never done    DIABETIC FOOT EXAM  Never done    DIABETIC EYE EXAM  Never done    URINE MICROALBUMIN  Never done    Hepatitis B (1 of 3 - 19+ 3-dose series) Never done    TDAP/TD VACCINES (1 - Tdap) Never done    MAMMOGRAM  Never done    INFLUENZA VACCINE  07/01/2024    PAP SMEAR  Never done    COVID-19 Vaccine (1 - 2024-25 season) Never done    ZOSTER VACCINE (1 of 2) Never done    HEMOGLOBIN A1C  02/23/2025    You are due for adacel Tdap vaccination. (provides protection against tetanus, diptheria and whooping cough) Please  get the immunization at your local pharmacy at your earliest convenience.  Please click on the link for more information about this vaccine.    https://www.cdc.gov/vaccines/vpd/dtap-tdap-td/public/index.html    You are due for Shingrix vaccination series ( the newest shingles vaccine).  It is a two shot series spaced 2-6 months apart. Please get this vaccine series started at your earliest convenience at your local pharmacy to help avoid shingles outbreak. It is more effective than the old Zostavax vaccine and is recommended even if you have had the Zostavax vaccine in the past.  Once the Shingrix series is completed, it does not need to be repeated.   For more information, please look at the website below:  https://www.cdc.gov/vaccines/vpd/shingles/public/shingrix/index.html  After on Lisinopril X1 week.  Check blood pressure cuff for accuracy and send 10 blood pressures over 2 weeks.  Watch sodium, alcohol and weight

## 2024-12-15 PROBLEM — E55.9 VITAMIN D DEFICIENCY: Status: ACTIVE | Noted: 2024-12-15

## 2024-12-16 ENCOUNTER — OFFICE VISIT (OUTPATIENT)
Dept: FAMILY MEDICINE CLINIC | Facility: CLINIC | Age: 49
End: 2024-12-16
Payer: MEDICAID

## 2024-12-16 VITALS
BODY MASS INDEX: 57.52 KG/M2 | HEIGHT: 60 IN | DIASTOLIC BLOOD PRESSURE: 93 MMHG | WEIGHT: 293 LBS | HEART RATE: 72 BPM | SYSTOLIC BLOOD PRESSURE: 161 MMHG | OXYGEN SATURATION: 97 % | TEMPERATURE: 97.1 F

## 2024-12-16 DIAGNOSIS — E66.01 CLASS 3 SEVERE OBESITY DUE TO EXCESS CALORIES WITH SERIOUS COMORBIDITY AND BODY MASS INDEX (BMI) OF 60.0 TO 69.9 IN ADULT: ICD-10-CM

## 2024-12-16 DIAGNOSIS — Z12.31 ENCOUNTER FOR SCREENING MAMMOGRAM FOR MALIGNANT NEOPLASM OF BREAST: ICD-10-CM

## 2024-12-16 DIAGNOSIS — Z23 NEED FOR INFLUENZA VACCINATION: ICD-10-CM

## 2024-12-16 DIAGNOSIS — G44.89 OTHER HEADACHE SYNDROME: ICD-10-CM

## 2024-12-16 DIAGNOSIS — K04.7 DENTAL INFECTION: ICD-10-CM

## 2024-12-16 DIAGNOSIS — E11.65 TYPE 2 DIABETES MELLITUS WITH HYPERGLYCEMIA, WITHOUT LONG-TERM CURRENT USE OF INSULIN: Primary | ICD-10-CM

## 2024-12-16 DIAGNOSIS — Z12.11 SCREENING FOR MALIGNANT NEOPLASM OF COLON: ICD-10-CM

## 2024-12-16 DIAGNOSIS — R20.2 PARESTHESIA: ICD-10-CM

## 2024-12-16 DIAGNOSIS — E78.49 OTHER HYPERLIPIDEMIA: ICD-10-CM

## 2024-12-16 DIAGNOSIS — E55.9 VITAMIN D DEFICIENCY: ICD-10-CM

## 2024-12-16 DIAGNOSIS — F41.9 ANXIETY: ICD-10-CM

## 2024-12-16 DIAGNOSIS — Z12.4 CERVICAL CANCER SCREENING: ICD-10-CM

## 2024-12-16 DIAGNOSIS — Z23 NEED FOR COVID-19 VACCINE: ICD-10-CM

## 2024-12-16 DIAGNOSIS — E66.813 CLASS 3 SEVERE OBESITY DUE TO EXCESS CALORIES WITH SERIOUS COMORBIDITY AND BODY MASS INDEX (BMI) OF 60.0 TO 69.9 IN ADULT: ICD-10-CM

## 2024-12-16 PROCEDURE — 90471 IMMUNIZATION ADMIN: CPT | Performed by: PREVENTIVE MEDICINE

## 2024-12-16 PROCEDURE — 99214 OFFICE O/P EST MOD 30 MIN: CPT | Performed by: PREVENTIVE MEDICINE

## 2024-12-16 PROCEDURE — 1160F RVW MEDS BY RX/DR IN RCRD: CPT | Performed by: PREVENTIVE MEDICINE

## 2024-12-16 PROCEDURE — 90656 IIV3 VACC NO PRSV 0.5 ML IM: CPT | Performed by: PREVENTIVE MEDICINE

## 2024-12-16 PROCEDURE — 1126F AMNT PAIN NOTED NONE PRSNT: CPT | Performed by: PREVENTIVE MEDICINE

## 2024-12-16 PROCEDURE — 1159F MED LIST DOCD IN RCRD: CPT | Performed by: PREVENTIVE MEDICINE

## 2024-12-16 PROCEDURE — 3046F HEMOGLOBIN A1C LEVEL >9.0%: CPT | Performed by: PREVENTIVE MEDICINE

## 2024-12-16 RX ORDER — LISINOPRIL 10 MG/1
10 TABLET ORAL DAILY
Qty: 90 TABLET | Refills: 3 | Status: SHIPPED | OUTPATIENT
Start: 2024-12-16

## 2024-12-16 RX ORDER — AMLODIPINE BESYLATE 10 MG/1
10 TABLET ORAL DAILY
Qty: 30 TABLET | Refills: 0 | Status: SHIPPED | OUTPATIENT
Start: 2024-12-16

## 2024-12-16 NOTE — PROGRESS NOTES
Subjective   Karissa Hi is a 49 y.o. female presents for   Chief Complaint   Patient presents with    Diabetes     3 month check up    Headache     For over a month    Hand Pain     Tingling at night mainly       Health Maintenance Due   Topic Date Due    COLORECTAL CANCER SCREENING  Never done    Pneumococcal Vaccine 0-64 (1 of 2 - PCV) Never done    DIABETIC FOOT EXAM  Never done    DIABETIC EYE EXAM  Never done    Hepatitis B (1 of 3 - 19+ 3-dose series) Never done    TDAP/TD VACCINES (1 - Tdap) Never done    MAMMOGRAM  Never done    PAP SMEAR  Never done    COVID-19 Vaccine (1 - 2024-25 season) Never done    ZOSTER VACCINE (1 of 2) Never done    HEMOGLOBIN A1C  02/23/2025       Diabetes  Hypoglycemia symptoms include headaches and nervousness/anxiousness.   Headache  Hand Pain   Associated symptoms include numbness.      History of Present Illness  The patient is a 49-year-old female who is here today to follow up on type 2 diabetes with hyperglycemia without long-term recurrent use of insulin, hyperlipidemia, anxiety, dental infection, class 3 severe obesity due to excess calories with serious comorbidities and a body mass index of 60, screening for malignancy of the colon, cervical cancer screening, breast cancer screening, vitamin D deficiency, need for influenza vaccine, need for COVID-19 vaccine, headache syndrome, and paresthesia of the hand.    She reports that her blood glucose levels have been well-controlled, with the most recent reading being 84 on Sunday. She has not experienced any readings above 200 or below 84. She does not experience any weakness associated with her blood glucose levels. She has an upcoming appointment with her ophthalmologist in February. She has not experienced any changes in her hearing or vision since her last visit. She has regular dental check-ups and plans to address a dental issue once her dentist recovers from COVID-19. She performs monthly self-breast examinations  in both lying and standing positions. She experiences occasional diarrhea, which she attributes to her blood glucose levels. She has not experienced any dysuria, hematuria, hematochezia, or unusual vaginal discharge. She also reports no systemic symptoms such as fever, chills, or night sweats. Her weight has been fluctuating, but she maintains an active lifestyle, including daily walks and treadmill use. She received her influenza vaccine today and declines the COVID-19 vaccine. She has not fasted today due to the late timing of her appointment. She has a scheduled diabetic eye exam in February. She is adopted and therefore unaware of her family history.    She reports experiencing constant tingling in both hands, predominantly affecting three fingers, with occasional involvement of an additional finger. She experiences pain at night, which disrupts her sleep. She suspects this may be related to her frequent computer use.    She has been experiencing dull headaches for approximately one month, which occasionally intensify. She also reports experiencing visual auras. She has not had any recent head trauma. She recalls a previous episode of persistent headaches, which eventually resolved. She reports sinus pain and morning nasal congestion.    She reports that her blood pressure readings have been generally good, often around 125/88, although she acknowledges that she becomes nervous during clinic visits. She is currently on amlodipine and metoprolol for blood pressure management.    Supplemental Information  She reports no chest pressure or palpitations. She experiences mild indigestion but does not consider it severe. She has had some wheezing.    FAMILY HISTORY  She is adopted and does not know her family medical history.    ALLERGIES  She is allergic to PENICILLIN.    MEDICATIONS  Current: Amlodipine, metoprolol    IMMUNIZATIONS  She received the influenza vaccine today.    Vitals:    12/16/24 1444 12/16/24 1445  "  BP: 142/100 161/93   BP Location: Left arm Right arm   Patient Position: Sitting Sitting   Cuff Size: Adult Adult   Pulse: 71 72   Temp: 97.1 °F (36.2 °C)    TempSrc: Temporal    SpO2: 97%    Weight: (!) 142 kg (313 lb)    Height: 152.4 cm (60\")      Body mass index is 61.13 kg/m².    Current Outpatient Medications on File Prior to Visit   Medication Sig Dispense Refill    atorvastatin (LIPITOR) 20 MG tablet Take 1 tablet by mouth Daily. 90 tablet 1    Blood Glucose Monitoring Suppl (Blood Glucose Monitor System) w/Device kit Use 1 kit Daily. 1 each 0    busPIRone (BUSPAR) 10 MG tablet TAKE 1 TABLET BY MOUTH TWICE DAILY 60 tablet 1    metFORMIN ER (GLUCOPHAGE-XR) 500 MG 24 hr tablet TAKE 2 TABLETS BY MOUTH IN THE MORNING AND 2 TABLETS IN THE EVENING FOR HIGH BLOOD SUGAR 360 tablet 0    metoprolol succinate XL (Toprol XL) 50 MG 24 hr tablet Take 1 tablet by mouth Daily. 90 tablet 1    [DISCONTINUED] chlorhexidine (PERIDEX) 0.12 % solution SWISH WITH 15 ML BY MOUTH FOR 30 SECONDS AND SPIT TWICE DAILY AS NEEDED.      sodium-potassium-magnesium sulfates (SUPREP) 17.5-3.13-1.6 GM/177ML solution oral solution PLEASE REPLACE WITH GOLYTELY 4000ML IF SUPREP IS NOT APPROVED BY INSURANCE 5 PM the day before your scheduled colonoscopy - Take your 1st dose of bowel prep 5 AM - Take your 2nd dose of bowel prep You may still have watery bowel movements after you finish drinking the solution. (Patient not taking: Reported on 12/16/2024) 177 mL 0    [DISCONTINUED] amLODIPine (NORVASC) 10 MG tablet TAKE 1 TABLET BY MOUTH DAILY 30 tablet 0    [DISCONTINUED] Semaglutide,0.25 or 0.5MG/DOS, (Ozempic, 0.25 or 0.5 MG/DOSE,) 2 MG/1.5ML solution pen-injector Inject 0.25 mg under the skin into the appropriate area as directed 1 (One) Time Per Week. For four weeks 1.5 mL 0     No current facility-administered medications on file prior to visit.       The following portions of the patient's history were reviewed and updated as appropriate: " allergies, current medications, past family history, past medical history, past social history, past surgical history, and problem list.    Review of Systems   HENT:  Positive for sinus pressure. Negative for dental problem.    Neurological:  Positive for numbness and headache.   Psychiatric/Behavioral:  The patient is nervous/anxious.        Objective   Physical Exam  Vitals reviewed.   Constitutional:       General: She is not in acute distress.     Appearance: She is well-developed. She is obese. She is not ill-appearing or toxic-appearing.   HENT:      Head: Normocephalic and atraumatic.      Right Ear: Tympanic membrane, ear canal and external ear normal.      Left Ear: Tympanic membrane, ear canal and external ear normal.      Nose: Nose normal.      Mouth/Throat:      Mouth: Mucous membranes are moist.      Pharynx: No posterior oropharyngeal erythema.   Eyes:      Extraocular Movements: Extraocular movements intact.      Conjunctiva/sclera: Conjunctivae normal.      Pupils: Pupils are equal, round, and reactive to light.   Cardiovascular:      Rate and Rhythm: Normal rate and regular rhythm.      Pulses:           Dorsalis pedis pulses are 1+ on the right side and 1+ on the left side.        Posterior tibial pulses are 1+ on the right side and 1+ on the left side.      Heart sounds: Normal heart sounds.   Pulmonary:      Effort: Pulmonary effort is normal.      Breath sounds: Normal breath sounds.   Abdominal:      General: Bowel sounds are normal. There is no distension.      Palpations: Abdomen is soft. There is no mass.      Tenderness: There is no abdominal tenderness.   Musculoskeletal:         General: Normal range of motion.      Cervical back: Neck supple.   Feet:      Right foot:      Protective Sensation: 10 sites tested.  10 sites sensed.      Skin integrity: Skin integrity normal. Callus present.      Toenail Condition: Right toenails are long.      Left foot:      Protective Sensation: 10 sites  tested.  10 sites sensed.      Skin integrity: Skin integrity normal. Callus present.      Toenail Condition: Left toenails are long.      Comments: Diabetic Foot Exam Performed and Monofilament Test Performed    Skin:     General: Skin is warm.   Neurological:      General: No focal deficit present.      Mental Status: She is alert and oriented to person, place, and time.   Psychiatric:         Mood and Affect: Mood normal.         Behavior: Behavior normal.       Physical Exam  Oral exam was performed.  Carotid sounds are normal.  Lungs were auscultated.  Heart rate and rhythm are normal.  Sensation is intact.    Vital Signs  Blood pressure readings are 142/101 and 161/93.    PHQ-9 Total Score:    Results  Laboratory Studies  Blood sugar was 84.         Assessment & Plan   Diagnoses and all orders for this visit:    1. Type 2 diabetes mellitus with hyperglycemia, without long-term current use of insulin (Primary)  -     Microalbumin / Creatinine Urine Ratio - Urine, Clean Catch  -     ORDER: Hemoglobin A1c; Future  -     Comprehensive Metabolic Panel; Future    2. Encounter for screening mammogram for malignant neoplasm of breast  -     Mammo Screening Digital Tomosynthesis Bilateral With CAD; Future    3. Need for COVID-19 vaccine    4. Other hyperlipidemia  -     Lipid Panel; Future    5. Anxiety  -     Magnesium; Future  -     TSH Rfx On Abnormal To Free T4; Future  -     Vitamin B12; Future    6. Dental infection  -     CBC Auto Differential; Future    7. Class 3 severe obesity due to excess calories with serious comorbidity and body mass index (BMI) of 60.0 to 69.9 in adult    8. Screening for malignant neoplasm of colon  -     Ambulatory Referral For Screening Colonoscopy    9. Cervical cancer screening  -     Ambulatory Referral to Obstetrics / Gynecology    10. Vitamin D deficiency  -     Vitamin D,25-Hydroxy; Future    11. Need for influenza vaccination  -     Fluzone >6mos ([id number])    12. Other  headache syndrome    13. Paresthesia    Other orders  -     lisinopril (PRINIVIL,ZESTRIL) 10 MG tablet; Take 1 tablet by mouth Daily.  Dispense: 90 tablet; Refill: 3      Assessment & Plan  1. Type 2 diabetes with hyperglycemia.  Her blood glucose levels have been well-managed, with no readings above 200 or below 84. She reports no weakness associated with her blood glucose levels. She has a scheduled diabetic eye exam in February. An order for a new glucometer will be placed.    2. Hyperlipidemia.    3. Anxiety.  She reports feeling better mentally.    4. Dental infection.  She reports occasional pain but overall improvement after dental treatment.    5. Severe obesity.  She is engaging in physical activities such as walking and using a treadmill.    6. Health maintenance.  She is advised to consider receiving the pneumonia vaccine today, and if not, to wait 2 months before getting it. She is also advised to consider the hepatitis B vaccine due to her diabetes. She is encouraged to receive the COVID-19 vaccine at a pharmacy. A referral for a Pap smear has been made to Dr. Chantel Nguyen in [place]. She is due for a mammogram. A referral to a colorectal specialist has been made for a colonoscopy.    7. Paresthesia of the hand.  Her symptoms suggest a possible diagnosis of carpal tunnel syndrome. She is advised to purchase wrist splints from a pharmacy or [employer] and wear them during sleep for a period of 6 weeks. Daytime use of the splints is discouraged to prevent wrist stiffness. If symptoms persist after 6 weeks, a referral to [facility] for further evaluation of potential carpal tunnel syndrome will be considered.    8. Headache syndrome.  Her blood pressure readings are elevated, which could be contributing to her headaches. She reports sinus pain and morning nasal congestion. She is advised to monitor her blood pressure for 1 week while on lisinopril. Lisinopril 10 mg will be added to her current  medication regimen. The prescription will be sent to [facility] in [place]. If her blood pressure remains uncontrolled, further management of her sinus issues will be considered, potentially including a CT scan of her head. If her headaches worsen, she is advised to contact the clinic.    9. Hypertension.  Her blood pressure readings are elevated, which could be contributing to her headaches. She is currently on amlodipine and metoprolol for blood pressure management. She is advised to monitor her blood pressure for 1 week while on lisinopril. Lisinopril 10 mg will be added to her current medication regimen. The prescription will be sent to [facility] in [place].    Follow-up  The patient will follow up in 3 months.    Patient Instructions     Health Maintenance Due   Topic Date Due    COLORECTAL CANCER SCREENING  Never done    Pneumococcal Vaccine 0-64 (1 of 2 - PCV) Never done    DIABETIC FOOT EXAM  Never done    DIABETIC EYE EXAM  Never done    URINE MICROALBUMIN  Never done    Hepatitis B (1 of 3 - 19+ 3-dose series) Never done    TDAP/TD VACCINES (1 - Tdap) Never done    MAMMOGRAM  Never done    INFLUENZA VACCINE  07/01/2024    PAP SMEAR  Never done    COVID-19 Vaccine (1 - 2024-25 season) Never done    ZOSTER VACCINE (1 of 2) Never done    HEMOGLOBIN A1C  02/23/2025    You are due for adacel Tdap vaccination. (provides protection against tetanus, diptheria and whooping cough) Please  get the immunization at your local pharmacy at your earliest convenience.  Please click on the link for more information about this vaccine.    https://www.cdc.gov/vaccines/vpd/dtap-tdap-td/public/index.html    You are due for Shingrix vaccination series ( the newest shingles vaccine).  It is a two shot series spaced 2-6 months apart. Please get this vaccine series started at your earliest convenience at your local pharmacy to help avoid shingles outbreak. It is more effective than the old Zostavax vaccine and is  recommended even if you have had the Zostavax vaccine in the past.  Once the Shingrix series is completed, it does not need to be repeated.   For more information, please look at the website below:  https://www.cdc.gov/vaccines/vpd/shingles/public/shingrix/index.html  After on Lisinopril X1 week.  Check blood pressure cuff for accuracy and send 10 blood pressures over 2 weeks.  Watch sodium, alcohol and weight          Patient or patient representative verbalized consent for the use of Ambient Listening during the visit with  Mariella Anglin MD for chart documentation. 12/16/2024  17:31 EST

## 2024-12-16 NOTE — TELEPHONE ENCOUNTER
Rx Refill Note  Requested Prescriptions     Pending Prescriptions Disp Refills    amLODIPine (NORVASC) 10 MG tablet [Pharmacy Med Name: AMLODIPINE BESYLATE 10MG TABLETS] 30 tablet 0     Sig: TAKE 1 TABLET BY MOUTH DAILY      Last office visit with prescribing clinician: 8/5/2024   Last telemedicine visit with prescribing clinician: Visit date not found   Next office visit with prescribing clinician: 12/16/2024                         Would you like a call back once the refill request has been completed: [] Yes [] No    If the office needs to give you a call back, can they leave a voicemail: [] Yes [] No    Karin Carver MA  12/16/24, 11:18 EST

## 2024-12-17 ENCOUNTER — TELEPHONE (OUTPATIENT)
Age: 49
End: 2024-12-17
Payer: MEDICAID

## 2024-12-17 ENCOUNTER — PREP FOR SURGERY (OUTPATIENT)
Dept: OTHER | Facility: HOSPITAL | Age: 49
End: 2024-12-17
Payer: MEDICAID

## 2024-12-17 DIAGNOSIS — Z12.11 ENCOUNTER FOR SCREENING COLONOSCOPY: Primary | ICD-10-CM

## 2024-12-17 NOTE — TELEPHONE ENCOUNTER
12/17/24  LM for pt over the phone.  She will need to  her Golytely Bowel Prep from her pharmacy and our schedulers will call her in the next 2-4 weeks to be scheduled for her colonoscopy with Dr. Grissom.

## 2024-12-18 PROBLEM — Z12.11 ENCOUNTER FOR SCREENING COLONOSCOPY: Status: ACTIVE | Noted: 2024-12-17

## 2024-12-23 ENCOUNTER — LAB (OUTPATIENT)
Dept: FAMILY MEDICINE CLINIC | Facility: CLINIC | Age: 49
End: 2024-12-23
Payer: MEDICAID

## 2024-12-23 DIAGNOSIS — E78.49 OTHER HYPERLIPIDEMIA: ICD-10-CM

## 2024-12-23 DIAGNOSIS — F41.9 ANXIETY: ICD-10-CM

## 2024-12-23 DIAGNOSIS — E55.9 VITAMIN D DEFICIENCY: ICD-10-CM

## 2024-12-23 DIAGNOSIS — E11.65 TYPE 2 DIABETES MELLITUS WITH HYPERGLYCEMIA, WITHOUT LONG-TERM CURRENT USE OF INSULIN: ICD-10-CM

## 2024-12-23 DIAGNOSIS — K04.7 DENTAL INFECTION: ICD-10-CM

## 2024-12-23 LAB
25(OH)D3 SERPL-MCNC: 20.8 NG/ML (ref 30–100)
ALBUMIN SERPL-MCNC: 4 G/DL (ref 3.5–5.2)
ALBUMIN/GLOB SERPL: 1.5 G/DL
ALP SERPL-CCNC: 71 U/L (ref 39–117)
ALT SERPL W P-5'-P-CCNC: 25 U/L (ref 1–33)
ANION GAP SERPL CALCULATED.3IONS-SCNC: 9 MMOL/L (ref 5–15)
AST SERPL-CCNC: 16 U/L (ref 1–32)
BASOPHILS # BLD AUTO: 0.07 10*3/MM3 (ref 0–0.2)
BASOPHILS NFR BLD AUTO: 0.9 % (ref 0–1.5)
BILIRUB SERPL-MCNC: 0.4 MG/DL (ref 0–1.2)
BUN SERPL-MCNC: 9 MG/DL (ref 6–20)
BUN/CREAT SERPL: 16.4 (ref 7–25)
CALCIUM SPEC-SCNC: 9.1 MG/DL (ref 8.6–10.5)
CHLORIDE SERPL-SCNC: 103 MMOL/L (ref 98–107)
CHOLEST SERPL-MCNC: 172 MG/DL (ref 0–200)
CO2 SERPL-SCNC: 27 MMOL/L (ref 22–29)
CREAT SERPL-MCNC: 0.55 MG/DL (ref 0.57–1)
DEPRECATED RDW RBC AUTO: 39.5 FL (ref 37–54)
EGFRCR SERPLBLD CKD-EPI 2021: 111.8 ML/MIN/1.73
EOSINOPHIL # BLD AUTO: 0.24 10*3/MM3 (ref 0–0.4)
EOSINOPHIL NFR BLD AUTO: 3 % (ref 0.3–6.2)
ERYTHROCYTE [DISTWIDTH] IN BLOOD BY AUTOMATED COUNT: 12.1 % (ref 12.3–15.4)
GLOBULIN UR ELPH-MCNC: 2.6 GM/DL
GLUCOSE SERPL-MCNC: 197 MG/DL (ref 65–99)
HBA1C MFR BLD: 9.4 % (ref 4.8–5.6)
HCT VFR BLD AUTO: 42.5 % (ref 34–46.6)
HDLC SERPL-MCNC: 52 MG/DL (ref 40–60)
HGB BLD-MCNC: 14.2 G/DL (ref 12–15.9)
IMM GRANULOCYTES # BLD AUTO: 0.03 10*3/MM3 (ref 0–0.05)
IMM GRANULOCYTES NFR BLD AUTO: 0.4 % (ref 0–0.5)
LDLC SERPL CALC-MCNC: 99 MG/DL (ref 0–100)
LDLC/HDLC SERPL: 1.85 {RATIO}
LYMPHOCYTES # BLD AUTO: 2.6 10*3/MM3 (ref 0.7–3.1)
LYMPHOCYTES NFR BLD AUTO: 32.1 % (ref 19.6–45.3)
MAGNESIUM SERPL-MCNC: 1.7 MG/DL (ref 1.6–2.6)
MCH RBC QN AUTO: 30.3 PG (ref 26.6–33)
MCHC RBC AUTO-ENTMCNC: 33.4 G/DL (ref 31.5–35.7)
MCV RBC AUTO: 90.6 FL (ref 79–97)
MONOCYTES # BLD AUTO: 0.85 10*3/MM3 (ref 0.1–0.9)
MONOCYTES NFR BLD AUTO: 10.5 % (ref 5–12)
NEUTROPHILS NFR BLD AUTO: 4.31 10*3/MM3 (ref 1.7–7)
NEUTROPHILS NFR BLD AUTO: 53.1 % (ref 42.7–76)
NRBC BLD AUTO-RTO: 0 /100 WBC (ref 0–0.2)
PLATELET # BLD AUTO: 277 10*3/MM3 (ref 140–450)
PMV BLD AUTO: 11.9 FL (ref 6–12)
POTASSIUM SERPL-SCNC: 4.2 MMOL/L (ref 3.5–5.2)
PROT SERPL-MCNC: 6.6 G/DL (ref 6–8.5)
RBC # BLD AUTO: 4.69 10*6/MM3 (ref 3.77–5.28)
SODIUM SERPL-SCNC: 139 MMOL/L (ref 136–145)
TRIGL SERPL-MCNC: 118 MG/DL (ref 0–150)
TSH SERPL DL<=0.05 MIU/L-ACNC: 2.25 UIU/ML (ref 0.27–4.2)
VIT B12 BLD-MCNC: 267 PG/ML (ref 211–946)
VLDLC SERPL-MCNC: 21 MG/DL (ref 5–40)
WBC NRBC COR # BLD AUTO: 8.1 10*3/MM3 (ref 3.4–10.8)

## 2024-12-23 PROCEDURE — 83036 HEMOGLOBIN GLYCOSYLATED A1C: CPT | Performed by: PREVENTIVE MEDICINE

## 2024-12-23 PROCEDURE — 82570 ASSAY OF URINE CREATININE: CPT | Performed by: PREVENTIVE MEDICINE

## 2024-12-23 PROCEDURE — 80050 GENERAL HEALTH PANEL: CPT | Performed by: PREVENTIVE MEDICINE

## 2024-12-23 PROCEDURE — 82043 UR ALBUMIN QUANTITATIVE: CPT | Performed by: PREVENTIVE MEDICINE

## 2024-12-23 PROCEDURE — 82607 VITAMIN B-12: CPT | Performed by: PREVENTIVE MEDICINE

## 2024-12-23 PROCEDURE — 83735 ASSAY OF MAGNESIUM: CPT | Performed by: PREVENTIVE MEDICINE

## 2024-12-23 PROCEDURE — 80061 LIPID PANEL: CPT | Performed by: PREVENTIVE MEDICINE

## 2024-12-23 PROCEDURE — 82306 VITAMIN D 25 HYDROXY: CPT | Performed by: PREVENTIVE MEDICINE

## 2024-12-23 PROCEDURE — 36415 COLL VENOUS BLD VENIPUNCTURE: CPT

## 2024-12-24 LAB
ALBUMIN UR-MCNC: 9.3 MG/DL
CREAT UR-MCNC: 141.5 MG/DL
MICROALBUMIN/CREAT UR: 65.7 MG/G (ref 0–29)

## 2024-12-24 NOTE — PROGRESS NOTES
Blood sugar has improved and A1c has gone down a couple of points but is still elevated at 9.4.  We need to add a second agent for your diabetes.  Do you wish to try another pill or would you like to try some sort of injection.  Please let me know.  You are spilling protein in your urine and would probably benefit from either Farxiga or Jardiance which ever 1 your insurance will pay for I will send 1 and if it is too expensive leave it at the pharmacy and let us know so we can prescribe the other one. both vitamin D and B12 are low so take of 1000 units of each over-the-counter and we will check with the next set of labs.  Bad cholesterol is 99 and goal is below 70.Can you do more to decrease your saturated fats or can we raise the atorvastatin to 40?  Please let us know about the above or if you have any other questions or concerns

## 2024-12-26 ENCOUNTER — TELEPHONE (OUTPATIENT)
Dept: FAMILY MEDICINE CLINIC | Facility: CLINIC | Age: 49
End: 2024-12-26
Payer: MEDICAID

## 2024-12-26 RX ORDER — SEMAGLUTIDE 1.34 MG/ML
0.25 INJECTION, SOLUTION SUBCUTANEOUS WEEKLY
Qty: 1.5 ML | Refills: 1 | Status: SHIPPED | OUTPATIENT
Start: 2024-12-26

## 2024-12-26 NOTE — TELEPHONE ENCOUNTER
"HUB TO RELAY    \"Blood sugar has improved and A1c has gone down a couple of points but is still elevated at 9.4.  We need to add a second agent for your diabetes.  Do you wish to try another pill or would you like to try some sort of injection.  Please let me know.  You are spilling protein in your urine and would probably benefit from either Farxiga or Jardiance which ever 1 your insurance will pay for I will send 1 and if it is too expensive leave it at the pharmacy and let us know so we can prescribe the other one. both vitamin D and B12 are low so take of 1000 units of each over-the-counter and we will check with the next set of labs.  Bad cholesterol is 99 and goal is below 70.Can you do more to decrease your saturated fats or can we raise the atorvastatin to 40?  Please let us know about the above or if you have any other questions or concerns\"  "

## 2024-12-26 NOTE — TELEPHONE ENCOUNTER
Name: Karissa Hi    Relationship: Self    Best Callback Number: [phone]    HUB PROVIDED THE RELAY MESSAGE FROM THE OFFICE.     ADDITIONAL INFORMATION: PT STATES SHE ALREADY RESPONDED TO PCP THROUGH [facility] IN REGARDS TO THESE QUESTIONS. MEDICATIONS HAVE BEEN SENT IN. PT WANTS TO KNOW IF SHE SHOULD CONTINUE METFORMIN. PLEASE ADVISE. PT ALSO STATES THAT PCP WANTED UPDATE ON HER HEADACHES AND THOSE HAVE FINALLY RESOLVED.

## 2024-12-26 NOTE — TELEPHONE ENCOUNTER
Glad that headaches have finally subsided.  And yes you should continue metformin along with the other medications.

## 2024-12-31 ENCOUNTER — PRIOR AUTHORIZATION (OUTPATIENT)
Dept: FAMILY MEDICINE CLINIC | Facility: CLINIC | Age: 49
End: 2024-12-31
Payer: MEDICAID

## 2025-01-13 RX ORDER — AMLODIPINE BESYLATE 10 MG/1
10 TABLET ORAL DAILY
Qty: 30 TABLET | Refills: 0 | Status: SHIPPED | OUTPATIENT
Start: 2025-01-13

## 2025-01-13 RX ORDER — BUSPIRONE HYDROCHLORIDE 10 MG/1
10 TABLET ORAL 2 TIMES DAILY
Qty: 60 TABLET | Refills: 1 | Status: SHIPPED | OUTPATIENT
Start: 2025-01-13

## 2025-01-13 NOTE — TELEPHONE ENCOUNTER
Rx Refill Note  Requested Prescriptions     Pending Prescriptions Disp Refills    busPIRone (BUSPAR) 10 MG tablet [Pharmacy Med Name: BUSPIRONE 10MG TABLETS] 60 tablet 1     Sig: TAKE 1 TABLET BY MOUTH TWICE DAILY      Last office visit with prescribing clinician: 12/16/2024   Last telemedicine visit with prescribing clinician: Visit date not found   Next office visit with prescribing clinician: 1/21/2025                         Would you like a call back once the refill request has been completed: [] Yes [] No    If the office needs to give you a call back, can they leave a voicemail: [] Yes [] No    Karin Carver MA  01/13/25, 08:02 EST

## 2025-01-30 RX ORDER — METOPROLOL SUCCINATE 50 MG/1
50 TABLET, EXTENDED RELEASE ORAL DAILY
Qty: 90 TABLET | Refills: 1 | Status: SHIPPED | OUTPATIENT
Start: 2025-01-30

## 2025-02-05 ENCOUNTER — TELEPHONE (OUTPATIENT)
Dept: FAMILY MEDICINE CLINIC | Facility: CLINIC | Age: 50
End: 2025-02-05
Payer: MEDICAID

## 2025-02-05 RX ORDER — METFORMIN HYDROCHLORIDE 500 MG/1
500 TABLET, EXTENDED RELEASE ORAL
Qty: 180 TABLET | Refills: 0 | Status: SHIPPED | OUTPATIENT
Start: 2025-02-05

## 2025-02-05 NOTE — TELEPHONE ENCOUNTER
Lets cut the metformin back to 1 tablet in the morning and 1 tablet at night and please correct the chart to reflect this and have her call us if her blood sugars go over 200

## 2025-02-05 NOTE — TELEPHONE ENCOUNTER
Caller: Karissa Hi    Relationship: Self    Best call back number: [phone]       Who are you requesting to speak with (clinical staff, provider,  specific staff member):        What was the call regarding: WITH DOSE OF MEDICATION PATIENT IS CURRENTLY ON HER SUGAR IS RUNNING AROUND 90.

## 2025-02-09 RX ORDER — AMLODIPINE BESYLATE 10 MG/1
10 TABLET ORAL DAILY
Qty: 30 TABLET | Refills: 0 | Status: SHIPPED | OUTPATIENT
Start: 2025-02-09

## 2025-02-11 ENCOUNTER — HOSPITAL ENCOUNTER (OUTPATIENT)
Dept: MAMMOGRAPHY | Facility: HOSPITAL | Age: 50
Discharge: HOME | End: 2025-02-11
Admitting: PREVENTIVE MEDICINE
Payer: MEDICAID

## 2025-02-11 DIAGNOSIS — Z12.31 ENCOUNTER FOR SCREENING MAMMOGRAM FOR MALIGNANT NEOPLASM OF BREAST: ICD-10-CM

## 2025-02-11 PROCEDURE — 77067 SCR MAMMO BI INCL CAD: CPT

## 2025-02-11 PROCEDURE — 77063 BREAST TOMOSYNTHESIS BI: CPT

## 2025-03-09 RX ORDER — AMLODIPINE BESYLATE 10 MG/1
10 TABLET ORAL DAILY
Qty: 30 TABLET | Refills: 0 | Status: SHIPPED | OUTPATIENT
Start: 2025-03-09

## 2025-03-09 NOTE — TELEPHONE ENCOUNTER
Rx Refill Note  Requested Prescriptions     Pending Prescriptions Disp Refills   • amLODIPine (NORVASC) 10 MG tablet [Pharmacy Med Name: AMLODIPINE BESYLATE 10MG TABLETS] 30 tablet 0     Sig: TAKE 1 TABLET BY MOUTH DAILY      Last office visit with prescribing clinician: 12/16/2024      Next office visit with prescribing clinician: 7/15/2025   3}  Jolynn New  03/09/25, 18:28 EDT

## 2025-03-20 RX ORDER — SEMAGLUTIDE 1.34 MG/ML
0.25 INJECTION, SOLUTION SUBCUTANEOUS WEEKLY
Qty: 1.5 ML | Refills: 1 | Status: SHIPPED | OUTPATIENT
Start: 2025-03-20

## 2025-04-07 RX ORDER — OXYCODONE AND ACETAMINOPHEN 5; 325 MG/1; MG/1
1 TABLET ORAL
COMMUNITY
Start: 2025-03-04

## 2025-04-07 RX ORDER — CLINDAMYCIN HYDROCHLORIDE 150 MG/1
CAPSULE ORAL
COMMUNITY
Start: 2025-03-17

## 2025-04-08 RX ORDER — AMLODIPINE BESYLATE 10 MG/1
10 TABLET ORAL DAILY
Qty: 30 TABLET | Refills: 0 | Status: SHIPPED | OUTPATIENT
Start: 2025-04-08

## 2025-05-13 RX ORDER — AMLODIPINE BESYLATE 10 MG/1
10 TABLET ORAL DAILY
Qty: 30 TABLET | Refills: 0 | Status: SHIPPED | OUTPATIENT
Start: 2025-05-13

## 2025-05-13 RX ORDER — AMLODIPINE BESYLATE 10 MG/1
10 TABLET ORAL DAILY
Qty: 30 TABLET | Refills: 0 | Status: CANCELLED | OUTPATIENT
Start: 2025-05-13

## 2025-05-13 RX ORDER — METFORMIN HYDROCHLORIDE 500 MG/1
500 TABLET, EXTENDED RELEASE ORAL
Qty: 180 TABLET | Refills: 0 | Status: SHIPPED | OUTPATIENT
Start: 2025-05-13

## 2025-05-13 NOTE — TELEPHONE ENCOUNTER
Caller: [id number] - [place], [place] - [facility] [facility] [address] - [phone] [facility] [phone] FX    Relationship: Pharmacy    Best call back number: [phone]    Requested Prescriptions:   Requested Prescriptions     Pending Prescriptions Disp Refills    amLODIPine (NORVASC) 10 MG tablet 30 tablet 0     Sig: Take 1 tablet by mouth Daily.        Pharmacy where request should be sent: [id number] - [facility] [place], [place] - [facility] [facility] [address] [facility] [address] - [phone] [facility] [phone] FX     Last office visit with prescribing clinician: 12/16/2024   Last telemedicine visit with prescribing clinician: Visit date not found   Next office visit with prescribing clinician: 7/15/2025     Additional details provided by patient: NEEDS FILLED     Does the patient have less than a 3 day supply:  [x] Yes  [] No    Would you like a call back once the refill request has been completed: [] Yes [x] No    If the office needs to give you a call back, can they leave a voicemail: [] Yes [x] No    Akbar Ingram   05/13/25 12:31 EDT

## 2025-06-13 RX ORDER — AMLODIPINE BESYLATE 10 MG/1
10 TABLET ORAL DAILY
Qty: 30 TABLET | Refills: 0 | Status: SHIPPED | OUTPATIENT
Start: 2025-06-13

## 2025-06-13 NOTE — TELEPHONE ENCOUNTER
Rx Refill Note  Requested Prescriptions     Pending Prescriptions Disp Refills    amLODIPine (NORVASC) 10 MG tablet [Pharmacy Med Name: AMLODIPINE BESYLATE 10MG TABLETS] 30 tablet 0     Sig: TAKE 1 TABLET BY MOUTH DAILY      Last office visit with prescribing clinician: 12/16/2024   Last telemedicine visit with prescribing clinician: Visit date not found   Next office visit with prescribing clinician: 7/15/2025                         Would you like a call back once the refill request has been completed: [] Yes [] No    If the office needs to give you a call back, can they leave a voicemail: [] Yes [] No    Meka Gaming MA  06/13/25, 07:40 EDT

## 2025-07-14 RX ORDER — AMLODIPINE BESYLATE 10 MG/1
10 TABLET ORAL DAILY
Qty: 30 TABLET | Refills: 0 | Status: SHIPPED | OUTPATIENT
Start: 2025-07-14

## 2025-07-14 NOTE — TELEPHONE ENCOUNTER
Rx Refill Note  Requested Prescriptions     Pending Prescriptions Disp Refills    amLODIPine (NORVASC) 10 MG tablet [Pharmacy Med Name: AMLODIPINE BESYLATE 10MG TABLETS] 30 tablet 0     Sig: TAKE 1 TABLET BY MOUTH DAILY      Last office visit with prescribing clinician: 12/16/2024   Last telemedicine visit with prescribing clinician: Visit date not found   Next office visit with prescribing clinician: 7/15/2025                         Would you like a call back once the refill request has been completed: [] Yes [] No    If the office needs to give you a call back, can they leave a voicemail: [] Yes [] No    Meka Gaming MA  07/14/25, 07:32 EDT

## 2025-07-29 NOTE — PATIENT INSTRUCTIONS
Health Maintenance Due   Topic Date Due    DIABETIC EYE EXAM  Never done    Hepatitis B (1 of 3 - 19+ 3-dose series) Never done    Pneumococcal Vaccine 50+ (1 of 2 - PCV) Never done    TDAP/TD VACCINES (1 - Tdap) Never done    PAP SMEAR  Never done    COLORECTAL CANCER SCREENING  Never done    COVID-19 Vaccine (1 - 2024-25 season) Never done    ZOSTER VACCINE (1 of 2) Never done    HEMOGLOBIN A1C  06/23/2025    Stop Lisinopril.  Take Metoprolol 100 daily and watch sodium.  After 7 days, send us 10 blood pressures and pulses

## 2025-07-31 ENCOUNTER — OFFICE VISIT (OUTPATIENT)
Dept: FAMILY MEDICINE CLINIC | Facility: CLINIC | Age: 50
End: 2025-07-31
Payer: OTHER GOVERNMENT

## 2025-07-31 VITALS
HEIGHT: 60 IN | SYSTOLIC BLOOD PRESSURE: 188 MMHG | DIASTOLIC BLOOD PRESSURE: 117 MMHG | BODY MASS INDEX: 57.52 KG/M2 | TEMPERATURE: 97.7 F | OXYGEN SATURATION: 96 % | WEIGHT: 293 LBS | HEART RATE: 86 BPM

## 2025-07-31 DIAGNOSIS — Z12.4 CERVICAL CANCER SCREENING: ICD-10-CM

## 2025-07-31 DIAGNOSIS — G44.89 OTHER HEADACHE SYNDROME: ICD-10-CM

## 2025-07-31 DIAGNOSIS — R52 BODY ACHES: ICD-10-CM

## 2025-07-31 DIAGNOSIS — Z12.11 SCREENING FOR MALIGNANT NEOPLASM OF COLON: ICD-10-CM

## 2025-07-31 DIAGNOSIS — E11.65 TYPE 2 DIABETES MELLITUS WITH HYPERGLYCEMIA, WITHOUT LONG-TERM CURRENT USE OF INSULIN: Primary | ICD-10-CM

## 2025-07-31 DIAGNOSIS — R06.83 SNORING: ICD-10-CM

## 2025-07-31 DIAGNOSIS — F41.9 ANXIETY: ICD-10-CM

## 2025-07-31 DIAGNOSIS — E78.49 OTHER HYPERLIPIDEMIA: ICD-10-CM

## 2025-07-31 DIAGNOSIS — R05.2 SUBACUTE COUGH: ICD-10-CM

## 2025-07-31 DIAGNOSIS — D75.1 POLYCYTHEMIA: ICD-10-CM

## 2025-07-31 DIAGNOSIS — E66.813 CLASS 3 SEVERE OBESITY DUE TO EXCESS CALORIES WITH SERIOUS COMORBIDITY AND BODY MASS INDEX (BMI) OF 60.0 TO 69.9 IN ADULT: ICD-10-CM

## 2025-07-31 DIAGNOSIS — I10 PRIMARY HYPERTENSION: ICD-10-CM

## 2025-07-31 DIAGNOSIS — E55.9 VITAMIN D DEFICIENCY: ICD-10-CM

## 2025-07-31 DIAGNOSIS — K04.7 DENTAL INFECTION: ICD-10-CM

## 2025-07-31 RX ORDER — METOPROLOL SUCCINATE 100 MG/1
100 TABLET, EXTENDED RELEASE ORAL DAILY
Qty: 90 TABLET | Refills: 1 | Status: SHIPPED | OUTPATIENT
Start: 2025-07-31

## 2025-07-31 RX ORDER — METOPROLOL SUCCINATE 50 MG/1
50 TABLET, EXTENDED RELEASE ORAL DAILY
Qty: 90 TABLET | Refills: 1 | Status: SHIPPED | OUTPATIENT
Start: 2025-07-31 | End: 2025-07-31

## 2025-07-31 NOTE — TELEPHONE ENCOUNTER
Rx Refill Note  Requested Prescriptions     Pending Prescriptions Disp Refills   • metoprolol succinate XL (TOPROL-XL) 50 MG 24 hr tablet [Pharmacy Med Name: METOPROLOL ER SUCCINATE 50MG TABS] 90 tablet 1     Sig: TAKE 1 TABLET BY MOUTH DAILY.      Last office visit with prescribing clinician: 12/16/2024   Last telemedicine visit with prescribing clinician: Visit date not found   Next office visit with prescribing clinician: 7/31/2025       Would you like a call back once the refill request has been completed: [] Yes [] No    If the office needs to give you a call back, can they leave a voicemail: [] Yes [] No    Karissa Cruz MA  07/31/25, 08:03 EDT

## 2025-07-31 NOTE — PROGRESS NOTES
Subjective   Karissa Hi is a 49 y.o. female presents for   Chief Complaint   Patient presents with    Annual Exam     Wants to discuss vaccines     Diabetes    Cough     Cough that has been going on since April that will not go away.     Generalized Body Aches     All over body aches that started when the cough did        Health Maintenance Due   Topic Date Due    DIABETIC EYE EXAM  Never done    TDAP/TD VACCINES (1 - Tdap) Never done    PAP SMEAR  Never done    COLORECTAL CANCER SCREENING  Never done    COVID-19 Vaccine (1 - 2024-25 season) Never done    ZOSTER VACCINE (1 of 2) Never done    HEMOGLOBIN A1C  06/23/2025       Diabetes  Cough     History of Present Illness  The patient is a 49-year-old female who is here today to follow up on type 2 diabetes, polycythemia, hyperlipidemia, headache syndrome, class III severe obesity, anxiety, dental infection, cervical cancer screening, colon cancer screening, subacute cough, body aches, and vitamin D deficiency.    She reports a persistent cough that disrupts her sleep and causes body aches. She has not taken her lisinopril today as she usually takes it at night. She does not experience severe headaches or chest pain. She also mentions having white coat syndrome, which causes her blood pressure to spike during doctor visits. She monitors her blood pressure at home, which typically reads around 114 systolic. However, she notes that emotional eating due to her cough can elevate her blood pressure. She has a history of allergies during the summer months. She is currently on amlodipine and metoprolol 50 mg.    Her blood sugar levels have been well-controlled at home, never exceeding 200. She has discontinued Ozempic due to adverse effects and metformin due to stomach upset.    She reports snoring at night and is open to trying a CPAP machine if diagnosed with obstructive sleep apnea. She is not coughing up blood or sputum and does not have heavy wheezing.    She has  "been under stress at work, which has led to some emotional eating. She has discussed this issue with her therapist. She continues to take BuSpar as needed for anxiety.    She has been mindful of her saturated fat intake. Her headaches have resolved.    She has been performing monthly breast self-exams and reports no tenderness or unusual vaginal discharge. She has had dental check-ups within the past year and had some teeth extracted. She is waiting for them to heal. She is no longer taking oxycodone or clindamycin for her tooth. She has not yet completed her cervical cancer screening due to work commitments but plans to reschedule soon.    Alcohol: She consumes alcohol twice a year.  Sleep: She reports disrupted sleep due to a persistent cough.    FAMILY HISTORY  She is adopted and does not know her family history.    Vitals:    07/31/25 1254 07/31/25 1259 07/31/25 1318   BP: (!) 203/146 (!) 175/131 (!) 188/117   BP Location: Left arm Right arm Left arm   Patient Position: Sitting Sitting Sitting   Cuff Size: Large Adult Large Adult Large Adult   Pulse: 74 75 86   Temp: 97.7 °F (36.5 °C)     TempSrc: Infrared     SpO2: 96%     Weight: (!) 146 kg (322 lb 3.2 oz)     Height: 152.4 cm (60\")       Body mass index is 62.93 kg/m².    Current Outpatient Medications on File Prior to Visit   Medication Sig Dispense Refill    amLODIPine (NORVASC) 10 MG tablet TAKE 1 TABLET BY MOUTH DAILY 30 tablet 0    atorvastatin (LIPITOR) 20 MG tablet Take 1 tablet by mouth Daily. 90 tablet 1    Blood Glucose Monitoring Suppl (Blood Glucose Monitor System) w/Device kit Use 1 kit Daily. 1 each 0    busPIRone (BUSPAR) 10 MG tablet TAKE 1 TABLET BY MOUTH TWICE DAILY 60 tablet 1    empagliflozin (Jardiance) 25 MG tablet tablet Take 1 tablet by mouth Daily. 90 tablet 3    lisinopril (PRINIVIL,ZESTRIL) 10 MG tablet Take 1 tablet by mouth Daily. 90 tablet 3    metFORMIN ER (GLUCOPHAGE-XR) 500 MG 24 hr tablet TAKE 1 TABLET BY MOUTH TWICE DAILY " BEFORE MEALS 180 tablet 0    polyethylene glycol (GoLYTELY) 236 g solution Mix the Golytely solution and put it in the fridge a few hours before drinking. Cold is better! 5:00 PM the day before your colonoscopy It's time for your 1st dose of bowel prep. Drink half of the bottle within 1 hour. Sip each glass quickly, and using a straw might make it easier. Put the rest in the fridge for later. 5 AM the morning of your colonoscopy, take your 2nd dose of bowel prep. 1 mL 0    sodium-potassium-magnesium sulfates (SUPREP) 17.5-3.13-1.6 GM/177ML solution oral solution PLEASE REPLACE WITH GOLYTELY 4000ML IF SUPREP IS NOT APPROVED BY INSURANCE 5 PM the day before your scheduled colonoscopy - Take your 1st dose of bowel prep 5 AM - Take your 2nd dose of bowel prep You may still have watery bowel movements after you finish drinking the solution. 177 mL 0    [DISCONTINUED] clindamycin (CLEOCIN) 150 MG capsule TAKE 1 CAPSULE BY MOUTH FOUR TIMES DAILY UNTIL GONE      [DISCONTINUED] oxyCODONE-acetaminophen (PERCOCET) 5-325 MG per tablet Take 1 tablet by mouth every 6 (six) to 8 (eight) hours as needed for Pain.      [DISCONTINUED] metoprolol succinate XL (TOPROL-XL) 50 MG 24 hr tablet TAKE 1 TABLET BY MOUTH DAILY. 90 tablet 1    [DISCONTINUED] Semaglutide,0.25 or 0.5MG/DOS, (Ozempic, 0.25 or 0.5 MG/DOSE,) 2 MG/1.5ML solution pen-injector Inject 0.25 mg under the skin into the appropriate area as directed 1 (One) Time Per Week. For four weeks (Patient not taking: Reported on 7/31/2025) 1.5 mL 1     No current facility-administered medications on file prior to visit.       The following portions of the patient's history were reviewed and updated as appropriate: allergies, current medications, past family history, past medical history, past social history, past surgical history, and problem list.    Review of Systems   Respiratory:  Positive for cough.    Psychiatric/Behavioral:  Positive for sleep disturbance and stress.         Objective   Physical Exam  Vitals reviewed.   Constitutional:       General: She is not in acute distress.     Appearance: She is well-developed. She is obese. She is not ill-appearing or toxic-appearing.   HENT:      Head: Normocephalic and atraumatic.      Right Ear: Tympanic membrane, ear canal and external ear normal.      Left Ear: Tympanic membrane, ear canal and external ear normal.      Nose: Nose normal.      Mouth/Throat:      Mouth: Mucous membranes are moist.      Pharynx: No posterior oropharyngeal erythema.   Eyes:      Extraocular Movements: Extraocular movements intact.      Conjunctiva/sclera: Conjunctivae normal.      Pupils: Pupils are equal, round, and reactive to light.   Neck:      Vascular: No carotid bruit.   Cardiovascular:      Rate and Rhythm: Normal rate and regular rhythm.      Pulses:           Dorsalis pedis pulses are 1+ on the right side and 1+ on the left side.        Posterior tibial pulses are 1+ on the right side and 1+ on the left side.      Heart sounds: Normal heart sounds.   Pulmonary:      Effort: Pulmonary effort is normal.      Breath sounds: Normal breath sounds.   Abdominal:      General: Bowel sounds are normal. There is no distension.      Palpations: Abdomen is soft. There is no mass.      Tenderness: There is no abdominal tenderness. There is no right CVA tenderness or left CVA tenderness.   Musculoskeletal:         General: Normal range of motion.      Cervical back: Neck supple. No tenderness.      Right lower leg: No edema.      Left lower leg: No edema.   Feet:      Right foot:      Protective Sensation: 10 sites tested.  10 sites sensed.      Skin integrity: Callus present.      Toenail Condition: Right toenails are long.      Left foot:      Protective Sensation: 10 sites tested.  10 sites sensed.      Skin integrity: Callus present.      Toenail Condition: Left toenails are long.      Comments: Diabetic Foot Exam Performed and Monofilament Test  Performed    Lymphadenopathy:      Cervical: No cervical adenopathy.   Skin:     General: Skin is warm.   Neurological:      General: No focal deficit present.      Mental Status: She is alert and oriented to person, place, and time.   Psychiatric:         Mood and Affect: Mood normal.         Behavior: Behavior normal.       Physical Exam  Mouth/Throat: No tenderness noted.  Respiratory: Clear to auscultation, no wheezing, rales or rhonchi.  Cardiovascular: Regular rate and rhythm, heart rate is 71.  Extremities: Sensation intact in feet.    PHQ-9 Total Score:    Results           Assessment & Plan   Diagnoses and all orders for this visit:    1. Type 2 diabetes mellitus with hyperglycemia, without long-term current use of insulin (Primary)  -     Comprehensive Metabolic Panel; Future  -     Hemoglobin A1c; Future  -     Microalbumin / Creatinine Urine Ratio - Urine, Clean Catch; Future  -     TSH Rfx On Abnormal To Free T4; Future  -     Vitamin B12; Future    2. Vitamin D deficiency  -     Vitamin D,25-Hydroxy; Future    3. Polycythemia  -     CBC Auto Differential; Future    4. Other hyperlipidemia  -     Lipid Panel; Future    5. Other headache syndrome    6. Class 3 severe obesity due to excess calories with serious comorbidity and body mass index (BMI) of 60.0 to 69.9 in adult    7. Anxiety  -     Magnesium; Future    8. Dental infection    9. Cervical cancer screening  -     Ambulatory Referral to Obstetrics / Gynecology    10. Screening for malignant neoplasm of colon  -     Ambulatory Referral For Screening Colonoscopy    11. Subacute cough    12. Body aches    13. Primary hypertension    14. Snoring  -     Ambulatory Referral to Sleep Medicine    Other orders  -     Hepatitis B Vaccine Adult IM (ENGERIX/RECOMBIVAX)  -     Pneumococcal Conjugate Vaccine 21 (18+ yrs)  -     metoprolol succinate XL (Toprol XL) 100 MG 24 hr tablet; Take 1 tablet by mouth Daily.  Dispense: 90 tablet; Refill: 1      Assessment  & Plan  1. Hypertension.  - Elevated blood pressure could be attributed to her cough and overall discomfort, as well as significant hypertension.  - The cough may be a side effect of lisinopril, which can manifest immediately or even years after initiation.  - Discontinue lisinopril and increase metoprolol to 100 mg daily, with instructions to halve the dose if her blood pressure drops too low. Monitor sodium intake and abstain from alcohol consumption.  - Provide 10 blood pressure readings and pulse rates over the next 2 weeks.    2. Type 2 Diabetes Mellitus.  - Blood sugars have been stable at home, not exceeding 200.  - Physical exam findings indicate no significant issues related to diabetes management.  - Continue current medication regimen, including Jardiance.  - If blood sugars become unstable, alternative medications will be considered.    3. Obstructive Sleep Apnea.  - Referral to a sleep specialist will be made for further evaluation and potential initiation of CPAP therapy.  - No significant findings on physical exam related to sleep apnea.  - Discussed the importance of oxygenation during sleep and the potential long-term effects of untreated sleep apnea.  - Referral to sleep doctor for home sleep study.    4. Anxiety.  - Anxiety is well-managed except for work-related stress.  - No significant findings on physical exam related to anxiety.  - Continue with BuSpar as needed.  - Discussed work-related stress and management strategies.    5. Hyperlipidemia.  - Monitoring saturated fat intake.  - No significant findings on physical exam related to hyperlipidemia.  - Continue current medication regimen, including atorvastatin.  - Discussed dietary management and cholesterol monitoring.    6. Health Maintenance.  - Cervical cancer screening to be scheduled for 09/2025.  - No significant findings on physical exam related to health maintenance.  - Receive first dose of hepatitis B vaccine and pneumonia vaccine  today.  - Colonoscopy will be scheduled for colon cancer screening.    Follow-up: The patient will follow up in 3 months.    Patient Instructions     Health Maintenance Due   Topic Date Due    DIABETIC EYE EXAM  Never done    Hepatitis B (1 of 3 - 19+ 3-dose series) Never done    Pneumococcal Vaccine 50+ (1 of 2 - PCV) Never done    TDAP/TD VACCINES (1 - Tdap) Never done    PAP SMEAR  Never done    COLORECTAL CANCER SCREENING  Never done    COVID-19 Vaccine (1 - 2024-25 season) Never done    ZOSTER VACCINE (1 of 2) Never done    HEMOGLOBIN A1C  06/23/2025    Stop Lisinopril.  Take Metoprolol 100 daily and watch sodium.  After 7 days, send us 10 blood pressures and pulses       Patient or patient representative verbalized consent for the use of Ambient Listening during the visit with  Mariella Anglin MD for chart documentation. 7/31/2025  16:57 EDT

## 2025-08-13 DIAGNOSIS — E11.65 TYPE 2 DIABETES MELLITUS WITH HYPERGLYCEMIA, WITHOUT LONG-TERM CURRENT USE OF INSULIN: ICD-10-CM

## 2025-08-13 DIAGNOSIS — F41.9 ANXIETY: ICD-10-CM

## 2025-08-13 DIAGNOSIS — E55.9 VITAMIN D DEFICIENCY: ICD-10-CM

## 2025-08-13 DIAGNOSIS — E78.49 OTHER HYPERLIPIDEMIA: ICD-10-CM

## 2025-08-13 DIAGNOSIS — D75.1 POLYCYTHEMIA: ICD-10-CM

## 2025-08-14 RX ORDER — METFORMIN HYDROCHLORIDE 500 MG/1
500 TABLET, EXTENDED RELEASE ORAL
Qty: 180 TABLET | Refills: 0 | Status: SHIPPED | OUTPATIENT
Start: 2025-08-14

## 2025-08-15 RX ORDER — AMLODIPINE BESYLATE 10 MG/1
10 TABLET ORAL DAILY
Qty: 30 TABLET | Refills: 0 | Status: SHIPPED | OUTPATIENT
Start: 2025-08-15